# Patient Record
Sex: FEMALE | Race: WHITE | Employment: OTHER
[De-identification: names, ages, dates, MRNs, and addresses within clinical notes are randomized per-mention and may not be internally consistent; named-entity substitution may affect disease eponyms.]

---

## 2022-03-19 PROBLEM — R55 SYNCOPE AND COLLAPSE: Status: ACTIVE | Noted: 2017-03-11

## 2023-11-20 ENCOUNTER — APPOINTMENT (OUTPATIENT)
Facility: HOSPITAL | Age: 88
End: 2023-11-20
Attending: INTERNAL MEDICINE
Payer: MEDICARE

## 2023-11-20 ENCOUNTER — HOSPITAL ENCOUNTER (INPATIENT)
Facility: HOSPITAL | Age: 88
LOS: 4 days | Discharge: NURSING, REHAB OR LONG-TERM CARE | End: 2023-11-24
Attending: INTERNAL MEDICINE | Admitting: INTERNAL MEDICINE
Payer: MEDICARE

## 2023-11-20 DIAGNOSIS — I48.91 ATRIAL FIBRILLATION WITH CONTROLLED VENTRICULAR RESPONSE (HCC): Primary | ICD-10-CM

## 2023-11-20 DIAGNOSIS — I26.99 OTHER ACUTE PULMONARY EMBOLISM WITHOUT ACUTE COR PULMONALE (HCC): ICD-10-CM

## 2023-11-20 PROBLEM — A41.9 SEPSIS (HCC): Status: ACTIVE | Noted: 2023-11-20

## 2023-11-20 LAB
ALBUMIN SERPL-MCNC: 2.3 G/DL (ref 3.4–5)
ALBUMIN/GLOB SERPL: 0.9 (ref 0.8–1.7)
ALP SERPL-CCNC: 175 U/L (ref 45–117)
ALT SERPL-CCNC: 28 U/L (ref 13–56)
ANION GAP SERPL CALC-SCNC: 5 MMOL/L (ref 3–18)
APPEARANCE UR: CLEAR
APTT PPP: 108.9 SEC (ref 23–36.4)
APTT PPP: >180 SEC (ref 23–36.4)
AST SERPL-CCNC: 27 U/L (ref 10–38)
B PERT DNA SPEC QL NAA+PROBE: NOT DETECTED
BACTERIA URNS QL MICRO: ABNORMAL /HPF
BASOPHILS # BLD: 0 K/UL (ref 0–0.1)
BASOPHILS NFR BLD: 0 % (ref 0–2)
BILIRUB SERPL-MCNC: 1.5 MG/DL (ref 0.2–1)
BILIRUB UR QL: NEGATIVE
BORDETELLA PARAPERTUSSIS BY PCR: NOT DETECTED
BUN SERPL-MCNC: 31 MG/DL (ref 7–18)
BUN/CREAT SERPL: 29 (ref 12–20)
C PNEUM DNA SPEC QL NAA+PROBE: NOT DETECTED
CALCIUM SERPL-MCNC: 8 MG/DL (ref 8.5–10.1)
CHLORIDE SERPL-SCNC: 105 MMOL/L (ref 100–111)
CO2 SERPL-SCNC: 25 MMOL/L (ref 21–32)
COLOR UR: YELLOW
CREAT SERPL-MCNC: 1.06 MG/DL (ref 0.6–1.3)
DIFFERENTIAL METHOD BLD: ABNORMAL
ECHO AO ASC DIAM: 3.1 CM
ECHO AO ASCENDING AORTA INDEX: 2.03 CM/M2
ECHO AO ROOT DIAM: 2.7 CM
ECHO AO ROOT INDEX: 1.76 CM/M2
ECHO AV AREA PEAK VELOCITY: 1.7 CM2
ECHO AV AREA VTI: 1.8 CM2
ECHO AV AREA/BSA PEAK VELOCITY: 1.1 CM2/M2
ECHO AV AREA/BSA VTI: 1.2 CM2/M2
ECHO AV MEAN GRADIENT: 3 MMHG
ECHO AV MEAN VELOCITY: 0.9 M/S
ECHO AV PEAK GRADIENT: 6 MMHG
ECHO AV PEAK VELOCITY: 1.2 M/S
ECHO AV VELOCITY RATIO: 0.83
ECHO AV VTI: 28.3 CM
ECHO BSA: 1.54 M2
ECHO LA VOL A-L A2C: 78 ML (ref 22–52)
ECHO LA VOL A-L A4C: 130 ML (ref 22–52)
ECHO LA VOL BP: 105 ML (ref 22–52)
ECHO LA VOL MOD A2C: 77 ML (ref 22–52)
ECHO LA VOL MOD A4C: 115 ML (ref 22–52)
ECHO LA VOL/BSA BIPLANE: 69 ML/M2 (ref 16–34)
ECHO LA VOLUME AREA LENGTH: 115 ML
ECHO LA VOLUME INDEX A-L A2C: 51 ML/M2 (ref 16–34)
ECHO LA VOLUME INDEX A-L A4C: 85 ML/M2 (ref 16–34)
ECHO LA VOLUME INDEX AREA LENGTH: 75 ML/M2 (ref 16–34)
ECHO LA VOLUME INDEX MOD A2C: 50 ML/M2 (ref 16–34)
ECHO LA VOLUME INDEX MOD A4C: 75 ML/M2 (ref 16–34)
ECHO LV E' LATERAL VELOCITY: 5 CM/S
ECHO LV E' SEPTAL VELOCITY: 3 CM/S
ECHO LV FRACTIONAL SHORTENING: 33 % (ref 28–44)
ECHO LV INTERNAL DIMENSION DIASTOLE INDEX: 1.57 CM/M2
ECHO LV INTERNAL DIMENSION DIASTOLIC: 2.4 CM (ref 3.9–5.3)
ECHO LV INTERNAL DIMENSION SYSTOLIC INDEX: 1.05 CM/M2
ECHO LV INTERNAL DIMENSION SYSTOLIC: 1.6 CM
ECHO LV IVSD: 1.2 CM (ref 0.6–0.9)
ECHO LV MASS 2D: 81.1 G (ref 67–162)
ECHO LV MASS INDEX 2D: 53 G/M2 (ref 43–95)
ECHO LV POSTERIOR WALL DIASTOLIC: 1.2 CM (ref 0.6–0.9)
ECHO LV RELATIVE WALL THICKNESS RATIO: 1
ECHO LVOT AREA: 2.3 CM2
ECHO LVOT AV VTI INDEX: 0.81
ECHO LVOT DIAM: 1.7 CM
ECHO LVOT MEAN GRADIENT: 2 MMHG
ECHO LVOT PEAK GRADIENT: 4 MMHG
ECHO LVOT PEAK VELOCITY: 1 M/S
ECHO LVOT STROKE VOLUME INDEX: 34 ML/M2
ECHO LVOT SV: 52 ML
ECHO LVOT VTI: 22.9 CM
ECHO MV A VELOCITY: 0.38 M/S
ECHO MV AREA PHT: 2.6 CM2
ECHO MV AREA VTI: 1.3 CM2
ECHO MV E DECELERATION TIME (DT): 259.3 MS
ECHO MV E VELOCITY: 1.22 M/S
ECHO MV E/A RATIO: 3.21
ECHO MV E/E' LATERAL: 24.4
ECHO MV E/E' RATIO (AVERAGED): 32.53
ECHO MV LVOT VTI INDEX: 1.78
ECHO MV MAX VELOCITY: 1.7 M/S
ECHO MV MEAN GRADIENT: 3 MMHG
ECHO MV MEAN VELOCITY: 0.8 M/S
ECHO MV PEAK GRADIENT: 12 MMHG
ECHO MV PRESSURE HALF TIME (PHT): 83.3 MS
ECHO MV VTI: 40.7 CM
ECHO RV TAPSE: 1.5 CM (ref 1.7–?)
ECHO TV REGURGITANT MAX VELOCITY: 2.97 M/S
ECHO TV REGURGITANT PEAK GRADIENT: 35 MMHG
EKG ATRIAL RATE: 258 BPM
EKG DIAGNOSIS: NORMAL
EKG Q-T INTERVAL: 538 MS
EKG QRS DURATION: 144 MS
EKG QTC CALCULATION (BAZETT): 538 MS
EKG R AXIS: 78 DEGREES
EKG T AXIS: 45 DEGREES
EKG VENTRICULAR RATE: 60 BPM
EOSINOPHIL # BLD: 0.1 K/UL (ref 0–0.4)
EOSINOPHIL NFR BLD: 1 % (ref 0–5)
EPITH CASTS URNS QL MICRO: ABNORMAL /LPF (ref 0–5)
ERYTHROCYTE [DISTWIDTH] IN BLOOD BY AUTOMATED COUNT: 17.1 % (ref 11.6–14.5)
FLUAV SUBTYP SPEC NAA+PROBE: NOT DETECTED
FLUBV RNA SPEC QL NAA+PROBE: NOT DETECTED
FOLATE SERPL-MCNC: 7.9 NG/ML (ref 3.1–17.5)
GLOBULIN SER CALC-MCNC: 2.6 G/DL (ref 2–4)
GLUCOSE SERPL-MCNC: 104 MG/DL (ref 74–99)
GLUCOSE UR STRIP.AUTO-MCNC: NEGATIVE MG/DL
HADV DNA SPEC QL NAA+PROBE: NOT DETECTED
HCOV 229E RNA SPEC QL NAA+PROBE: NOT DETECTED
HCOV HKU1 RNA SPEC QL NAA+PROBE: NOT DETECTED
HCOV NL63 RNA SPEC QL NAA+PROBE: NOT DETECTED
HCOV OC43 RNA SPEC QL NAA+PROBE: NOT DETECTED
HCT VFR BLD AUTO: 26.4 % (ref 35–45)
HGB BLD-MCNC: 9.2 G/DL (ref 12–16)
HGB UR QL STRIP: NEGATIVE
HMPV RNA SPEC QL NAA+PROBE: NOT DETECTED
HPIV1 RNA SPEC QL NAA+PROBE: NOT DETECTED
HPIV2 RNA SPEC QL NAA+PROBE: NOT DETECTED
HPIV3 RNA SPEC QL NAA+PROBE: NOT DETECTED
HPIV4 RNA SPEC QL NAA+PROBE: NOT DETECTED
IMM GRANULOCYTES # BLD AUTO: 0.1 K/UL (ref 0–0.04)
IMM GRANULOCYTES NFR BLD AUTO: 1 % (ref 0–0.5)
KETONES UR QL STRIP.AUTO: NEGATIVE MG/DL
L PNEUMO AG UR QL IA: NEGATIVE
LEUKOCYTE ESTERASE UR QL STRIP.AUTO: ABNORMAL
LYMPHOCYTES # BLD: 0.8 K/UL (ref 0.9–3.6)
LYMPHOCYTES NFR BLD: 7 % (ref 21–52)
M PNEUMO DNA SPEC QL NAA+PROBE: NOT DETECTED
MCH RBC QN AUTO: 36.8 PG (ref 24–34)
MCHC RBC AUTO-ENTMCNC: 34.8 G/DL (ref 31–37)
MCV RBC AUTO: 105.6 FL (ref 78–100)
MONOCYTES # BLD: 0.6 K/UL (ref 0.05–1.2)
MONOCYTES NFR BLD: 5 % (ref 3–10)
NEUTS SEG # BLD: 9.7 K/UL (ref 1.8–8)
NEUTS SEG NFR BLD: 86 % (ref 40–73)
NITRITE UR QL STRIP.AUTO: NEGATIVE
NRBC # BLD: 0.05 K/UL (ref 0–0.01)
NRBC BLD-RTO: 0.4 PER 100 WBC
NT PRO BNP: 6997 PG/ML (ref 0–1800)
PH UR STRIP: 5.5 (ref 5–8)
PLATELET # BLD AUTO: 182 K/UL (ref 135–420)
PMV BLD AUTO: 10.4 FL (ref 9.2–11.8)
POTASSIUM SERPL-SCNC: 4.2 MMOL/L (ref 3.5–5.5)
PROCALCITONIN SERPL-MCNC: 1.44 NG/ML
PROT SERPL-MCNC: 4.9 G/DL (ref 6.4–8.2)
PROT UR STRIP-MCNC: NEGATIVE MG/DL
RBC # BLD AUTO: 2.5 M/UL (ref 4.2–5.3)
RBC #/AREA URNS HPF: NEGATIVE /HPF (ref 0–5)
RSV RNA SPEC QL NAA+PROBE: NOT DETECTED
RV+EV RNA SPEC QL NAA+PROBE: NOT DETECTED
S PNEUM AG UR QL: NEGATIVE
SARS-COV-2 RNA RESP QL NAA+PROBE: NOT DETECTED
SODIUM SERPL-SCNC: 135 MMOL/L (ref 136–145)
SP GR UR REFRACTOMETRY: 1.01 (ref 1–1.03)
UROBILINOGEN UR QL STRIP.AUTO: 0.2 EU/DL (ref 0.2–1)
VIT B12 SERPL-MCNC: >2000 PG/ML (ref 211–911)
WBC # BLD AUTO: 11.4 K/UL (ref 4.6–13.2)
WBC URNS QL MICRO: ABNORMAL /HPF (ref 0–4)

## 2023-11-20 PROCEDURE — 6360000002 HC RX W HCPCS: Performed by: INTERNAL MEDICINE

## 2023-11-20 PROCEDURE — 97535 SELF CARE MNGMENT TRAINING: CPT

## 2023-11-20 PROCEDURE — 71045 X-RAY EXAM CHEST 1 VIEW: CPT

## 2023-11-20 PROCEDURE — APPSS15 APP SPLIT SHARED TIME 0-15 MINUTES

## 2023-11-20 PROCEDURE — 6370000000 HC RX 637 (ALT 250 FOR IP): Performed by: HOSPITALIST

## 2023-11-20 PROCEDURE — 6370000000 HC RX 637 (ALT 250 FOR IP): Performed by: INTERNAL MEDICINE

## 2023-11-20 PROCEDURE — 2580000003 HC RX 258: Performed by: INTERNAL MEDICINE

## 2023-11-20 PROCEDURE — 93010 ELECTROCARDIOGRAM REPORT: CPT | Performed by: INTERNAL MEDICINE

## 2023-11-20 PROCEDURE — 80053 COMPREHEN METABOLIC PANEL: CPT

## 2023-11-20 PROCEDURE — 83880 ASSAY OF NATRIURETIC PEPTIDE: CPT

## 2023-11-20 PROCEDURE — 84145 PROCALCITONIN (PCT): CPT

## 2023-11-20 PROCEDURE — 81001 URINALYSIS AUTO W/SCOPE: CPT

## 2023-11-20 PROCEDURE — 97166 OT EVAL MOD COMPLEX 45 MIN: CPT

## 2023-11-20 PROCEDURE — 82607 VITAMIN B-12: CPT

## 2023-11-20 PROCEDURE — 87040 BLOOD CULTURE FOR BACTERIA: CPT

## 2023-11-20 PROCEDURE — 87086 URINE CULTURE/COLONY COUNT: CPT

## 2023-11-20 PROCEDURE — 87449 NOS EACH ORGANISM AG IA: CPT

## 2023-11-20 PROCEDURE — 99222 1ST HOSP IP/OBS MODERATE 55: CPT | Performed by: INTERNAL MEDICINE

## 2023-11-20 PROCEDURE — 97530 THERAPEUTIC ACTIVITIES: CPT

## 2023-11-20 PROCEDURE — 82746 ASSAY OF FOLIC ACID SERUM: CPT

## 2023-11-20 PROCEDURE — 2700000000 HC OXYGEN THERAPY PER DAY

## 2023-11-20 PROCEDURE — 93306 TTE W/DOPPLER COMPLETE: CPT

## 2023-11-20 PROCEDURE — 93306 TTE W/DOPPLER COMPLETE: CPT | Performed by: INTERNAL MEDICINE

## 2023-11-20 PROCEDURE — 0202U NFCT DS 22 TRGT SARS-COV-2: CPT

## 2023-11-20 PROCEDURE — 94761 N-INVAS EAR/PLS OXIMETRY MLT: CPT

## 2023-11-20 PROCEDURE — 36415 COLL VENOUS BLD VENIPUNCTURE: CPT

## 2023-11-20 PROCEDURE — 85730 THROMBOPLASTIN TIME PARTIAL: CPT

## 2023-11-20 PROCEDURE — 85025 COMPLETE CBC W/AUTO DIFF WBC: CPT

## 2023-11-20 PROCEDURE — 1100000000 HC RM PRIVATE

## 2023-11-20 PROCEDURE — 93005 ELECTROCARDIOGRAM TRACING: CPT | Performed by: INTERNAL MEDICINE

## 2023-11-20 PROCEDURE — 97162 PT EVAL MOD COMPLEX 30 MIN: CPT

## 2023-11-20 RX ORDER — HEPARIN SODIUM 1000 [USP'U]/ML
40 INJECTION, SOLUTION INTRAVENOUS; SUBCUTANEOUS PRN
Status: DISCONTINUED | OUTPATIENT
Start: 2023-11-20 | End: 2023-11-22

## 2023-11-20 RX ORDER — CYCLOSPORINE 0.5 MG/ML
1 EMULSION OPHTHALMIC
Status: ON HOLD | COMMUNITY

## 2023-11-20 RX ORDER — POLYETHYLENE GLYCOL 3350 17 G/17G
17 POWDER, FOR SOLUTION ORAL DAILY PRN
Status: DISCONTINUED | OUTPATIENT
Start: 2023-11-20 | End: 2023-11-21

## 2023-11-20 RX ORDER — SPIRONOLACTONE 25 MG/1
25 TABLET ORAL DAILY
Status: ON HOLD | COMMUNITY

## 2023-11-20 RX ORDER — IPRATROPIUM BROMIDE AND ALBUTEROL SULFATE 2.5; .5 MG/3ML; MG/3ML
1 SOLUTION RESPIRATORY (INHALATION) EVERY 4 HOURS PRN
Status: DISCONTINUED | OUTPATIENT
Start: 2023-11-20 | End: 2023-11-24 | Stop reason: HOSPADM

## 2023-11-20 RX ORDER — ATENOLOL 50 MG/1
50 TABLET ORAL DAILY
Status: DISCONTINUED | OUTPATIENT
Start: 2023-11-20 | End: 2023-11-24 | Stop reason: HOSPADM

## 2023-11-20 RX ORDER — ONDANSETRON 4 MG/1
4 TABLET, ORALLY DISINTEGRATING ORAL EVERY 8 HOURS PRN
Status: DISCONTINUED | OUTPATIENT
Start: 2023-11-20 | End: 2023-11-24 | Stop reason: HOSPADM

## 2023-11-20 RX ORDER — SPIRONOLACTONE AND HYDROCHLOROTHIAZIDE 25; 25 MG/1; MG/1
1 TABLET ORAL DAILY
Status: ON HOLD | COMMUNITY
Start: 2023-09-05 | End: 2023-11-20

## 2023-11-20 RX ORDER — MAGNESIUM SULFATE IN WATER 40 MG/ML
2000 INJECTION, SOLUTION INTRAVENOUS PRN
Status: DISCONTINUED | OUTPATIENT
Start: 2023-11-20 | End: 2023-11-24 | Stop reason: HOSPADM

## 2023-11-20 RX ORDER — SODIUM CHLORIDE 9 MG/ML
INJECTION, SOLUTION INTRAVENOUS PRN
Status: DISCONTINUED | OUTPATIENT
Start: 2023-11-20 | End: 2023-11-24 | Stop reason: HOSPADM

## 2023-11-20 RX ORDER — ATENOLOL 50 MG/1
50 TABLET ORAL DAILY
Status: ON HOLD | COMMUNITY
Start: 2023-09-05

## 2023-11-20 RX ORDER — HEPARIN SODIUM 1000 [USP'U]/ML
80 INJECTION, SOLUTION INTRAVENOUS; SUBCUTANEOUS ONCE
Status: COMPLETED | OUTPATIENT
Start: 2023-11-20 | End: 2023-11-20

## 2023-11-20 RX ORDER — ASPIRIN 81 MG/1
81 TABLET ORAL DAILY
Status: DISCONTINUED | OUTPATIENT
Start: 2023-11-20 | End: 2023-11-24 | Stop reason: HOSPADM

## 2023-11-20 RX ORDER — ACETAMINOPHEN 325 MG/1
650 TABLET ORAL EVERY 6 HOURS PRN
Status: DISCONTINUED | OUTPATIENT
Start: 2023-11-20 | End: 2023-11-24 | Stop reason: HOSPADM

## 2023-11-20 RX ORDER — SPIRONOLACTONE 25 MG/1
25 TABLET ORAL DAILY
Status: DISCONTINUED | OUTPATIENT
Start: 2023-11-20 | End: 2023-11-24 | Stop reason: HOSPADM

## 2023-11-20 RX ORDER — SODIUM CHLORIDE 0.9 % (FLUSH) 0.9 %
5-40 SYRINGE (ML) INJECTION PRN
Status: DISCONTINUED | OUTPATIENT
Start: 2023-11-20 | End: 2023-11-24 | Stop reason: HOSPADM

## 2023-11-20 RX ORDER — ONDANSETRON 2 MG/ML
4 INJECTION INTRAMUSCULAR; INTRAVENOUS EVERY 6 HOURS PRN
Status: DISCONTINUED | OUTPATIENT
Start: 2023-11-20 | End: 2023-11-24 | Stop reason: HOSPADM

## 2023-11-20 RX ORDER — FUROSEMIDE 10 MG/ML
40 INJECTION INTRAMUSCULAR; INTRAVENOUS ONCE
Status: COMPLETED | OUTPATIENT
Start: 2023-11-20 | End: 2023-11-20

## 2023-11-20 RX ORDER — VANCOMYCIN 1.75 G/350ML
1250 INJECTION, SOLUTION INTRAVENOUS ONCE
Status: COMPLETED | OUTPATIENT
Start: 2023-11-20 | End: 2023-11-20

## 2023-11-20 RX ORDER — HEPARIN SODIUM 10000 [USP'U]/100ML
5-30 INJECTION, SOLUTION INTRAVENOUS CONTINUOUS
Status: DISPENSED | OUTPATIENT
Start: 2023-11-20 | End: 2023-11-22

## 2023-11-20 RX ORDER — ASPIRIN 81 MG/1
81 TABLET ORAL DAILY
Qty: 14 TABLET | Refills: 0 | Status: ON HOLD | COMMUNITY
Start: 2023-11-10 | End: 2023-11-24

## 2023-11-20 RX ORDER — HEPARIN SODIUM 1000 [USP'U]/ML
80 INJECTION, SOLUTION INTRAVENOUS; SUBCUTANEOUS PRN
Status: DISCONTINUED | OUTPATIENT
Start: 2023-11-20 | End: 2023-11-22

## 2023-11-20 RX ORDER — ACETAMINOPHEN 650 MG/1
650 SUPPOSITORY RECTAL EVERY 6 HOURS PRN
Status: DISCONTINUED | OUTPATIENT
Start: 2023-11-20 | End: 2023-11-24 | Stop reason: HOSPADM

## 2023-11-20 RX ORDER — MULTIVITAMIN WITH IRON
1 TABLET ORAL DAILY
Status: DISCONTINUED | OUTPATIENT
Start: 2023-11-20 | End: 2023-11-24 | Stop reason: HOSPADM

## 2023-11-20 RX ORDER — POTASSIUM CHLORIDE 7.45 MG/ML
10 INJECTION INTRAVENOUS PRN
Status: DISCONTINUED | OUTPATIENT
Start: 2023-11-20 | End: 2023-11-24 | Stop reason: HOSPADM

## 2023-11-20 RX ORDER — SODIUM CHLORIDE 0.9 % (FLUSH) 0.9 %
5-40 SYRINGE (ML) INJECTION EVERY 12 HOURS SCHEDULED
Status: DISCONTINUED | OUTPATIENT
Start: 2023-11-20 | End: 2023-11-24 | Stop reason: HOSPADM

## 2023-11-20 RX ADMIN — ATENOLOL 50 MG: 25 TABLET ORAL at 09:14

## 2023-11-20 RX ADMIN — SODIUM CHLORIDE, PRESERVATIVE FREE 10 ML: 5 INJECTION INTRAVENOUS at 21:08

## 2023-11-20 RX ADMIN — CEFEPIME 1000 MG: 1 INJECTION, POWDER, FOR SOLUTION INTRAMUSCULAR; INTRAVENOUS at 21:07

## 2023-11-20 RX ADMIN — ASPIRIN 81 MG: 81 TABLET, COATED ORAL at 09:14

## 2023-11-20 RX ADMIN — FUROSEMIDE 40 MG: 10 INJECTION, SOLUTION INTRAMUSCULAR; INTRAVENOUS at 09:14

## 2023-11-20 RX ADMIN — SPIRONOLACTONE 25 MG: 25 TABLET ORAL at 09:14

## 2023-11-20 RX ADMIN — VANCOMYCIN 1250 MG: 1.75 INJECTION, SOLUTION INTRAVENOUS at 09:15

## 2023-11-20 RX ADMIN — HEPARIN SODIUM 4430 UNITS: 1000 INJECTION, SOLUTION INTRAVENOUS; SUBCUTANEOUS at 06:52

## 2023-11-20 RX ADMIN — HEPARIN SODIUM 18 UNITS/KG/HR: 10000 INJECTION, SOLUTION INTRAVENOUS at 06:50

## 2023-11-20 RX ADMIN — SODIUM CHLORIDE, PRESERVATIVE FREE 10 ML: 5 INJECTION INTRAVENOUS at 09:28

## 2023-11-20 RX ADMIN — CEFEPIME 2000 MG: 2 INJECTION, POWDER, FOR SOLUTION INTRAVENOUS at 09:14

## 2023-11-20 RX ADMIN — THERA TABS 1 TABLET: TAB at 16:19

## 2023-11-20 ASSESSMENT — PAIN SCALES - GENERAL
PAINLEVEL_OUTOF10: 0

## 2023-11-20 ASSESSMENT — ENCOUNTER SYMPTOMS
SORE THROAT: 0
ABDOMINAL PAIN: 0
NAUSEA: 0
VOMITING: 0
WHEEZING: 0
SHORTNESS OF BREATH: 0

## 2023-11-20 NOTE — PLAN OF CARE
Problem: Discharge Planning  Goal: Discharge to home or other facility with appropriate resources  Outcome: Progressing  Flowsheets (Taken 11/20/2023 0800)  Discharge to home or other facility with appropriate resources:   Identify barriers to discharge with patient and caregiver   Arrange for needed discharge resources and transportation as appropriate   Identify discharge learning needs (meds, wound care, etc)     Problem: Safety - Adult  Goal: Free from fall injury  Outcome: Progressing     Problem: Skin/Tissue Integrity  Goal: Absence of new skin breakdown  Description: 1. Monitor for areas of redness and/or skin breakdown  2. Assess vascular access sites hourly  3. Every 4-6 hours minimum:  Change oxygen saturation probe site  4. Every 4-6 hours:  If on nasal continuous positive airway pressure, respiratory therapy assess nares and determine need for appliance change or resting period. Outcome: Progressing     Problem: Physical Therapy - Adult  Goal: By Discharge: Performs mobility at highest level of function for planned discharge setting. See evaluation for individualized goals. Description: Physical Therapy Goals:  Initiated 11/20/2023 to be met within 7-10 days. 1.  Patient will move from supine to sit and sit to supine  in bed with contact guard assistance. 2.  Patient will transfer from bed to chair and chair to bed with minimal assistance using the least restrictive device. 3.  Patient will perform sit to stand with minimal assistance. 4.  Patient will ambulate with modified minimal assistance for 50 feet with the least restrictive device. 5.  Patient will perform LE exercises to increase strength/ROM for functional tasks    PLOF: Patient is coming from rehab facility following right hip surgery. She was independent with ADL's and mobility. Lives alone.       11/20/2023 1352 by Eliana Johns PT  Outcome: Progressing     Problem: Occupational Therapy - Adult  Goal: By Discharge:

## 2023-11-20 NOTE — PROGRESS NOTES
conducted an initial consultation and Spiritual Assessment for Lily Borrego, who is a 80 y. o.,female. Patient's Primary Language is: [place]. According to the patient's EMR Rastafarian Affiliation is: None. The reason the Patient came to the hospital is:   Patient Active Problem List    Diagnosis Date Noted    Sepsis ([address]) 11/20/2023    Recurrent UTI     Atrial fibrillation with controlled ventricular response ([address])     Urinary retention     Syncope and collapse 03/11/2017    Essential hypertension, benign 11/04/2014    History of frequent urinary tract infections 08/23/2013    Incomplete bladder emptying 10/19/2012    Microhematuria 10/19/2012    UTI (urinary tract infection), uncomplicated 78/26/8780        The  provided the following Interventions:  Initiated a relationship of care and support. Explored issues of michelle, belief, spirituality and Baptism/ritual needs while hospitalized. Listened empathically. Provided chaplaincy education concerning Advance Medical Directive. Provided information about Spiritual Care Services. Offered prayer and assurance of continued prayers on patient's behalf. Chart reviewed. The following outcomes where achieved:  Patient shared limited information about both their medical narrative and spiritual journey/beliefs.  confirmed Patient's Rastafarian Affiliation. Patient processed feeling about current hospitalization. Patient expressed gratitude for 's visit. Assessment:  Patient does not have any Baptism/cultural needs that will affect patient's preferences in health care. There are no spiritual or Baptism issues which require intervention at this time. Plan:  Chaplains will continue to follow and will provide pastoral care on an as needed/requested basis.  recommends bedside caregivers page  on duty if patient shows signs of acute spiritual or emotional distress. Per patient, doing well.  Not feeling miserable or any pain. Peaceful and hopeful to be discharged soon. Patient hearing impaired. Prayer of comfort.       [facility]   Staff [address] [facility]  [phone]

## 2023-11-20 NOTE — CONSULTS
Comprehensive Nutrition Assessment    Type and Reason for Visit:  Initial, Consult    Nutrition Recommendations/Plan:   Modify current diet- remove carbohydrate restriction r/t pt without h/o DM. Continue 2000 mL fluid restriction per MD.   Plan to add oral nutrition supplement to optimize nutrition intake opportunity: Ensure Plus High Protein (each provides 350 kcal, 20g protein) BID  Plan to add MVI r/t pt reports poor meal intake PTA. Monitor PO intake/tolerance of meals/supplements, weight, labs, and POC while admitted. Malnutrition Assessment:  Malnutrition Status:  Insufficient data (at risk for malnutrition r/t reports of poor meal intake PTA, unable to verify) (11/20/23 1204)    Context:  Acute Illness       Nutrition History and Allergies: PMHx: HTN, urinary retention with chronic sanchez with recurrent UTI's, afib not on anticoagulation. Wt hx: c wt- 122.125 lb (bed scale), 97 lb (9/20/23, +25.9%), 100 lb (5/11/23, +22.1%), 94 lb (8/9/22, -29.9%), no significant wt loss documented, though note pt underweight for most weights listed in history and question accuracy of current wt vs weights listed in history. Peanut allergy noted. Nutrition Assessment:    Pt presented with fever and hypoxia. Admitted for management of sepsis. Consult noted for poor intake/appetite x 5 or more days. Pt on droplet plus isolation, unable to reach pt on phone. Noted pt with carbohydrate restriction, no h/o diabetes. Plan to remove carbohydrate restriction from diet and add oral nutrition supplement to promote PO intake. Nutrition Related Findings:    Last BM: not documented. Edema: BLE. Labs: no BMP completed. Pertinent meds: reviewed. Wound Type: None      Current Nutrition Intake & Therapies:    Average Meal Intake: Unable to assess (No PO data)  Average Supplements Intake: None Ordered  ADULT DIET;  Regular; 3 carb choices (45 gm/meal); 2000 ml    Anthropometric Measures:  Height: 154.9 cm (5' 1\")  Ideal Body

## 2023-11-20 NOTE — PROGRESS NOTES
Review of Systems    Physical Exam  Skin:           3507- Patient JAKE X 4. Direct admit from [facility]. Patient cleaned, oriented to room, call light within reach, bed in the lowest position, alarm activated. Admitting MD paged to assess patient and enter orders. 1412- Dr. Kimberly Carney bedside. 0630- Orders placed to start heparin drip.     0650- Heparin drip initiated and verified with secondary RN.     0730- Bedside shift change report given to Lily Johnson RN (oncoming nurse) by Marli Deshpande RN (offgoing nurse). Report included the following information Nurse Handoff Report, Adult Overview, and Event Log.

## 2023-11-20 NOTE — PROGRESS NOTES
0700: Bedside and Verbal shift change report given to Sloane Rubin RN (oncoming nurse) by Bran Zhao RN (offgoing nurse). Report included the following information Nurse Handoff Report, Intake/Output, MAR, and Quality Measures. Heparin gtt running at 18u/kg/h.    0800: Morning assessment completed. Pt denies pain at this time. Call bell within reach, bed in lowest locked position. Bed alarm on.    1100: 1100 yellow/fabiola in sanchez bag prior to removal. Sanchez pt had PTA removed per order. Ordered respiratory panel sent down per order. 1340: Pt straight cath'd per order. 400ml output achieved. Pt tolerated well. Specimen awaiting pickup.    1452: Heparin gtt stopped for an hour per protocol. Pt APTT >180.0 Attending paged. 1611: Heparin gtt restarted, rate adjusted to 15u/kg/hr. Next APTT due 2200 hours. 1700: Pt received a full linen change, repositioning at this time. Call bell within reach, bed in lowest, locked position. Pt has bilateral hearing aids in a case charging on chair in room. Sanchez catheter inserted per order. Pt tolerated well. 1250 output achieved. 1800: Report called to [address] Rose Montelongo RN on 4N. Awaiting transport at this time.

## 2023-11-20 NOTE — PLAN OF CARE
Problem: Physical Therapy - Adult  Goal: By Discharge: Performs mobility at highest level of function for planned discharge setting. See evaluation for individualized goals. Description: Physical Therapy Goals:  Initiated 11/20/2023 to be met within 7-10 days. 1.  Patient will move from supine to sit and sit to supine  in bed with contact guard assistance. 2.  Patient will transfer from bed to chair and chair to bed with minimal assistance using the least restrictive device. 3.  Patient will perform sit to stand with minimal assistance. 4.  Patient will ambulate with modified minimal assistance for 50 feet with the least restrictive device. 5.  Patient will perform LE exercises to increase strength/ROM for functional tasks    PLOF: Patient is coming from rehab facility following right hip surgery. She was independent with ADL's and mobility. Lives alone. Outcome: Progressing     PHYSICAL THERAPY EVALUATION    Patient: Rosa Paul (80 y.o. female)  Date: 11/20/2023  Primary Diagnosis: Sepsis ([address]) [A41.9]       Precautions: Fall Risk, Weight Bearing, Right Lower Extremity Weight Bearing: Weight Bearing As Tolerated (s/p R hip sx following femoral fx),  ,  ,  ,  ,  ,      ASSESSMENT :  Cleared by nursing for bed level evaluation. Seen with OT to maximize safety with mobility. Patient received resting in bed, alert, and agreeable to PT evaluation. Pleasant and cooperative. [place]. Min/mod A supine<>sit transfer. She sat EOB for about 8 minutes performing right LE exercises per flow sheet with SBA. Able to scoot laterally along EOB with CGA and returns to supine with assistance to manage right leg. Patient left resting comfortably with pillows to support swollen UE's and to float heels. All needs left within reach and bed alarm activated for safety.      DEFICITS/IMPAIRMENTS:    , Body Structures, Functions, Activity Limitations Requiring Skilled Therapeutic Intervention: Decreased functional mobility ;Decreased strength;Decreased safe awareness;Decreased balance;Decreased endurance; Increased pain    Patient will benefit from skilled intervention to address the above impairments. Patient's rehabilitation potential/Therapy Prognosis: Good. Factors which may influence rehabilitation potential include:   []         None noted  []         Mental ability/status  []         Medical condition  [x]         Home/family situation and support systems  [x]         Safety awareness  [x]         Pain tolerance/management  []         Other:      PLAN :  Recommendations and Planned Interventions:   [x]           Bed Mobility Training             [x]    Neuromuscular Re-Education  [x]           Transfer Training                   []    Orthotic/Prosthetic Training  [x]           Gait Training                          []    Modalities  [x]           Therapeutic Exercises           []    Edema Management/Control  [x]           Therapeutic Activities            [x]    Family Training/Education  [x]           Patient Education  []           Other (comment):    Frequency/Duration: Patient will be followed by physical therapy to address goals, 1-2 times per day/3-5 days per week to address goals. Further Equipment Recommendations for Discharge: JEAN CLAUDE    [facility]: AM-PAC Inpatient Mobility Raw Score : 13      Current research shows that an AM-PAC score of 17 (13 without stairs) or less is not associated with a discharge to the patient's home setting. Based on an AM-PAC score and their current functional mobility deficits, it is recommended that the patient have 5-7 sessions per week of Physical Therapy at d/c to increase the patient's independence. Currently, this patient demonstrates the potential endurance, and/or tolerance for 3 hours of therapy each day at d/c. This [facility] score should be considered in conjunction with interdisciplinary team recommendations to determine the most appropriate discharge setting.  Patient's social

## 2023-11-21 ENCOUNTER — APPOINTMENT (OUTPATIENT)
Facility: HOSPITAL | Age: 88
End: 2023-11-21
Attending: INTERNAL MEDICINE
Payer: MEDICARE

## 2023-11-21 PROBLEM — E43 SEVERE PROTEIN-CALORIE MALNUTRITION (HCC): Status: ACTIVE | Noted: 2023-11-21

## 2023-11-21 LAB
ANION GAP SERPL CALC-SCNC: 5 MMOL/L (ref 3–18)
APTT PPP: 103.2 SEC (ref 23–36.4)
BACTERIA SPEC CULT: NORMAL
BASOPHILS # BLD: 0 K/UL (ref 0–0.1)
BASOPHILS NFR BLD: 0 % (ref 0–2)
BUN SERPL-MCNC: 33 MG/DL (ref 7–18)
BUN/CREAT SERPL: 27 (ref 12–20)
CALCIUM SERPL-MCNC: 8 MG/DL (ref 8.5–10.1)
CHLORIDE SERPL-SCNC: 103 MMOL/L (ref 100–111)
CO2 SERPL-SCNC: 28 MMOL/L (ref 21–32)
CREAT SERPL-MCNC: 1.21 MG/DL (ref 0.6–1.3)
DIFFERENTIAL METHOD BLD: ABNORMAL
EOSINOPHIL # BLD: 0.3 K/UL (ref 0–0.4)
EOSINOPHIL NFR BLD: 4 % (ref 0–5)
ERYTHROCYTE [DISTWIDTH] IN BLOOD BY AUTOMATED COUNT: 17.4 % (ref 11.6–14.5)
GLUCOSE SERPL-MCNC: 108 MG/DL (ref 74–99)
HCT VFR BLD AUTO: 25.6 % (ref 35–45)
HGB BLD-MCNC: 8.6 G/DL (ref 12–16)
IMM GRANULOCYTES # BLD AUTO: 0.1 K/UL (ref 0–0.04)
IMM GRANULOCYTES NFR BLD AUTO: 1 % (ref 0–0.5)
LYMPHOCYTES # BLD: 1 K/UL (ref 0.9–3.6)
LYMPHOCYTES NFR BLD: 14 % (ref 21–52)
MAGNESIUM SERPL-MCNC: 1.9 MG/DL (ref 1.6–2.6)
MCH RBC QN AUTO: 34.8 PG (ref 24–34)
MCHC RBC AUTO-ENTMCNC: 33.6 G/DL (ref 31–37)
MCV RBC AUTO: 103.6 FL (ref 78–100)
MONOCYTES # BLD: 0.6 K/UL (ref 0.05–1.2)
MONOCYTES NFR BLD: 8 % (ref 3–10)
NEUTS SEG # BLD: 5.6 K/UL (ref 1.8–8)
NEUTS SEG NFR BLD: 73 % (ref 40–73)
NRBC # BLD: 0.03 K/UL (ref 0–0.01)
NRBC BLD-RTO: 0.4 PER 100 WBC
PHOSPHATE SERPL-MCNC: 3.2 MG/DL (ref 2.5–4.9)
PLATELET # BLD AUTO: 180 K/UL (ref 135–420)
PMV BLD AUTO: 10.3 FL (ref 9.2–11.8)
POTASSIUM SERPL-SCNC: 3.5 MMOL/L (ref 3.5–5.5)
RBC # BLD AUTO: 2.47 M/UL (ref 4.2–5.3)
SERVICE CMNT-IMP: NORMAL
SODIUM SERPL-SCNC: 136 MMOL/L (ref 136–145)
VANCOMYCIN SERPL-MCNC: 18.8 UG/ML (ref 5–40)
WBC # BLD AUTO: 7.6 K/UL (ref 4.6–13.2)

## 2023-11-21 PROCEDURE — 1100000000 HC RM PRIVATE

## 2023-11-21 PROCEDURE — 99232 SBSQ HOSP IP/OBS MODERATE 35: CPT | Performed by: HOSPITALIST

## 2023-11-21 PROCEDURE — 94761 N-INVAS EAR/PLS OXIMETRY MLT: CPT

## 2023-11-21 PROCEDURE — 80202 ASSAY OF VANCOMYCIN: CPT

## 2023-11-21 PROCEDURE — 36415 COLL VENOUS BLD VENIPUNCTURE: CPT

## 2023-11-21 PROCEDURE — 80048 BASIC METABOLIC PNL TOTAL CA: CPT

## 2023-11-21 PROCEDURE — 6370000000 HC RX 637 (ALT 250 FOR IP): Performed by: INTERNAL MEDICINE

## 2023-11-21 PROCEDURE — 99232 SBSQ HOSP IP/OBS MODERATE 35: CPT | Performed by: INTERNAL MEDICINE

## 2023-11-21 PROCEDURE — 83735 ASSAY OF MAGNESIUM: CPT

## 2023-11-21 PROCEDURE — 6360000002 HC RX W HCPCS: Performed by: INTERNAL MEDICINE

## 2023-11-21 PROCEDURE — 2580000003 HC RX 258: Performed by: INTERNAL MEDICINE

## 2023-11-21 PROCEDURE — 93970 EXTREMITY STUDY: CPT

## 2023-11-21 PROCEDURE — 85025 COMPLETE CBC W/AUTO DIFF WBC: CPT

## 2023-11-21 PROCEDURE — 84100 ASSAY OF PHOSPHORUS: CPT

## 2023-11-21 PROCEDURE — 6370000000 HC RX 637 (ALT 250 FOR IP): Performed by: HOSPITALIST

## 2023-11-21 PROCEDURE — 85730 THROMBOPLASTIN TIME PARTIAL: CPT

## 2023-11-21 RX ORDER — POLYETHYLENE GLYCOL 3350 17 G/17G
17 POWDER, FOR SOLUTION ORAL DAILY
Status: DISCONTINUED | OUTPATIENT
Start: 2023-11-21 | End: 2023-11-24 | Stop reason: HOSPADM

## 2023-11-21 RX ORDER — SENNA AND DOCUSATE SODIUM 50; 8.6 MG/1; MG/1
2 TABLET, FILM COATED ORAL NIGHTLY
Status: DISCONTINUED | OUTPATIENT
Start: 2023-11-21 | End: 2023-11-24 | Stop reason: HOSPADM

## 2023-11-21 RX ORDER — GAUZE BANDAGE 2" X 2"
100 BANDAGE TOPICAL DAILY
Status: DISCONTINUED | OUTPATIENT
Start: 2023-11-21 | End: 2023-11-24 | Stop reason: HOSPADM

## 2023-11-21 RX ADMIN — DOCUSATE SODIUM 50 MG AND SENNOSIDES 8.6 MG 2 TABLET: 8.6; 5 TABLET, FILM COATED ORAL at 21:47

## 2023-11-21 RX ADMIN — SPIRONOLACTONE 25 MG: 25 TABLET ORAL at 09:22

## 2023-11-21 RX ADMIN — Medication 100 MG: at 15:51

## 2023-11-21 RX ADMIN — HEPARIN SODIUM 15 UNITS/KG/HR: 10000 INJECTION, SOLUTION INTRAVENOUS at 19:47

## 2023-11-21 RX ADMIN — CEFEPIME 1000 MG: 1 INJECTION, POWDER, FOR SOLUTION INTRAMUSCULAR; INTRAVENOUS at 21:47

## 2023-11-21 RX ADMIN — POLYETHYLENE GLYCOL 3350 17 G: 17 POWDER, FOR SOLUTION ORAL at 15:51

## 2023-11-21 RX ADMIN — SODIUM CHLORIDE, PRESERVATIVE FREE 10 ML: 5 INJECTION INTRAVENOUS at 21:30

## 2023-11-21 RX ADMIN — ASPIRIN 81 MG: 81 TABLET, COATED ORAL at 09:22

## 2023-11-21 RX ADMIN — CEFEPIME 1000 MG: 1 INJECTION, POWDER, FOR SOLUTION INTRAMUSCULAR; INTRAVENOUS at 10:46

## 2023-11-21 RX ADMIN — THERA TABS 1 TABLET: TAB at 09:22

## 2023-11-21 RX ADMIN — ATENOLOL 50 MG: 25 TABLET ORAL at 09:22

## 2023-11-21 RX ADMIN — SODIUM CHLORIDE, PRESERVATIVE FREE 10 ML: 5 INJECTION INTRAVENOUS at 09:22

## 2023-11-21 RX ADMIN — VANCOMYCIN HYDROCHLORIDE 750 MG: 750 INJECTION, POWDER, LYOPHILIZED, FOR SOLUTION INTRAVENOUS at 09:24

## 2023-11-21 ASSESSMENT — PAIN SCALES - GENERAL
PAINLEVEL_OUTOF10: 0

## 2023-11-21 NOTE — PROGRESS NOTES
Bedside and Verbal shift change report given to [facility] Dave (oncoming nurse) by Ad Gonzales RN (offgoing nurse). Report included the following information Nurse Handoff Report, Index, Adult Overview, Surgery Report, Intake/Output, MAR, and Recent Results.

## 2023-11-21 NOTE — PROGRESS NOTES
Comprehensive Nutrition Assessment    Type and Reason for Visit:  Reassess, Consult    Nutrition Recommendations/Plan:   Update food preferences in diet order  Continue daily MVI supplement r/t pt with malnutrition. Add daily thiamine supplement  Continue nutrition supplement: Ensure Plus/ Enlive (350 kcal, 20 gm protein each); specify in order that pt prefers ensure enlive. Okay for family to provide ensure enlive if not available in-house. Monitor PO intake/tolerance of meals/supplements, weight, labs, and POC while admitted. Malnutrition Assessment:  Malnutrition Status:  Severe malnutrition (11/21/23 1311)    Context: Acute Illness  Findings of clinical characteristics of malnutrition:   Energy Intake:  Mild decrease in energy intake (Comment) (usually eats small meals)  Weight Loss:  Unable to assess     Body Fat Loss: Moderate body fat loss Orbital, Buccal region   Muscle Mass Loss: Moderate muscle mass loss Hand (interosseous), Temples (temporalis)  Fluid Accumulation:  Moderate to Severe (unable to assess if related to nutrition status)     Strength:  Not Performed      Nutrition Assessment:    Pt presented with fever and hypoxia. Admitted for management of sepsis. Pt reported that she eats 3 meals daily at home; small meals. UBW is 91 lb per daughter report; pt recently weighing 112 lb, daughter reported was due to fluid retention; noted pt has edema. Pt currently eating ~15- 25% of meals. Food preferences discussed. Pt receiving ensure original supplement, but per daughter, pt usually drinks ensure enlive; this item is low in stock in-house, but usually is on supply in-house. Discussed with daughter, okay to provide ensure enlive if not available in-house. NFPE completed. Food allergies: peanuts     Nutrition Related Findings:    BM 11/19.  +edema. Pertinent meds: MVI, aldactone, lasix, antibiotic. Pertinent labs reviewed. BUN/ Cr 33/ 1.21 high/ low;  GFR 41 low.  Wound Type: None

## 2023-11-21 NOTE — PROGRESS NOTES
[facility] Hospitalist Group  Progress Note    Patient: Milton Asher Age: 80 y.o. : 1928 MR#: [id number] SSN: [id number]  Date/Time: 2023    Subjective:     Patient seen and examined at bedside, she reports she feels better than yesterday. Appetite good. She is constipated, last bowel movement a couple of days ago. Not sure when she last passed gas. Denies chest pain, shortness of breath, abdominal pain. Extensive discussion held with daughter and patient regarding rehab, they agree. Daughter at bedside requests a tour of ARU, liaison Ronal Cuevas aware. Dispo: pending sepsis workup, transition to noac when ok w/ all subspecialists. ARU referral initiated. Assessment/Plan:     1. Sepsis POA (fever, leukocytosis), source most likely urine. Follow urine culture. blood cultures  ngtd. MRSA culture screen pending. 2. Small pleural effusions  3. Pulmonary emboli, DVTs on heparin gtt. transition to NOAC per pulmonology, input appreciated. 4. Chronic urinary retention, now with Bah, did CIC in the past.  Recommend to leave Bah in place at this time given recent surgery, will be uncomfortable for patient in the setting of recent hip surgery  5. History of atrial fib, on atenolol and heparin drip. 6.  hypertension  7. Catheter associated cystitis. Urinalysis, urine culture. Urine culture was also sent from [facility], results pending at this time, we will follow. 8.  Recent hip fracture status postrepair at outside hospital . Consult wound regarding dressing change. 9.  Acute hypoxic respiratory failure POA, in the setting of acute PE.  10.Status post pacemaker   11. Macrocytic anemia  12. Groundglass opacities on CT, diffuse. Pulmonology follows. 15.  Advanced age. Prior to hip fracture, patient required no assistance with ADLs. 14.  Cardiomyopathy, abnormal EKG. Echo noted. 15.  Preserved EF on echo Echo. Grade 3 diastolic dysfunction. LA Volume Index A/L 75 16 - 34 mL/m2    LVOT Stroke Volume Index 34.0 mL/m2    LVOT Area 2.3 cm2    LA Volume Index A-L A2C 51 (A) 16 - 34 mL/m2    LA Volume Index A-L A4C 85 (A) 16 - 34 mL/m2    LA Volume Index MOD A2C 50 (A) 16 - 34 ml/m2    LA Volume Index MOD A4C 75 (A) 16 - 34 ml/m2    Ao Root Index 1.76 cm/m2    Ascending Aorta Index 2.03 cm/m2    AV Velocity Ratio 0.83     LVOT:AV VTI Index 0.81     ANISHA/BSA VTI 1.2 cm2/m2    ANISHA/BSA Peak Velocity 1.1 cm2/m2    MV:LVOT VTI Index 1.78    APTT    Collection Time: 11/20/23  9:30 PM   Result Value Ref Range    .9 (H) 23.0 - 36.4 SEC   CBC with Auto Differential    Collection Time: 11/21/23  3:57 AM   Result Value Ref Range    WBC 7.6 4.6 - 13.2 K/uL    RBC 2.47 (L) 4.20 - 5.30 M/uL    Hemoglobin 8.6 (L) 12.0 - 16.0 g/dL    Hematocrit 25.6 (L) 35.0 - 45.0 %    .6 (H) 78.0 - 100.0 FL    MCH 34.8 (H) 24.0 - 34.0 PG    MCHC 33.6 31.0 - 37.0 g/dL    RDW 17.4 (H) 11.6 - 14.5 %    Platelets 143 303 - 394 K/uL    MPV 10.3 9.2 - 11.8 FL    Nucleated RBCs 0.4 (H) 0  WBC    nRBC 0.03 (H) 0.00 - 0.01 K/uL    Neutrophils % 73 40 - 73 %    Lymphocytes % 14 (L) 21 - 52 %    Monocytes % 8 3 - 10 %    Eosinophils % 4 0 - 5 %    Basophils % 0 0 - 2 %    Immature Granulocytes 1 (H) 0.0 - 0.5 %    Neutrophils Absolute 5.6 1.8 - 8.0 K/UL    Lymphocytes Absolute 1.0 0.9 - 3.6 K/UL    Monocytes Absolute 0.6 0.05 - 1.2 K/UL    Eosinophils Absolute 0.3 0.0 - 0.4 K/UL    Basophils Absolute 0.0 0.0 - 0.1 K/UL    Absolute Immature Granulocyte 0.1 (H) 0.00 - 0.04 K/UL    Differential Type AUTOMATED     Vancomycin Level, Random    Collection Time: 11/21/23  3:57 AM   Result Value Ref Range    Vancomycin Rm 18.8 5.0 - 40.0 UG/ML   Basic Metabolic Panel    Collection Time: 11/21/23  3:57 AM   Result Value Ref Range    Sodium 136 136 - 145 mmol/L    Potassium 3.5 3.5 - 5.5 mmol/L    Chloride 103 100 - 111 mmol/L    CO2 28 21 - 32 mmol/L    Anion Gap 5 3.0 - 18 mmol/L

## 2023-11-21 NOTE — PROGRESS NOTES
Infectious Disease Consultation Note        Reason: Sepsis    Current abx Prior abx   Zosyn, vancomycin      Lines:       Assessment :  89 y.o.  female with a past medical history of HTN, urinary retention with chronic sanchez s/p intermittent straight cath,  recurrent UTI's, afib not on anticoagulation who presented to  [facility] on 11/20/23  with fever and hypoxia. Clinical presentation c/w sepsis (POA) due to catheter associated cystitis    Rule out intercurrent viral infection, secondary bloodstream infection    Significant pyuria noted on urinalysis 11/6 along with history of contamination of Sanchez catheter due to stool, fever/chills likely suggestive of catheter associated cystitis    Acute hypoxic respiratory failure -present on admission could be due to sepsis in the setting of acute pulmonary embolism     Bilateral pulm embolism-likely due to undiagnosed DVT, immobility from recent infection    Recommendations:    Continue cefepime, vancomycin for now  Follow up urine culture   Follow blood cultures   recommend Sanchez removal, performing intermittent straight cath once patient able to perform this  Management of pulm embolism per primary team.  Consider pulmonary evaluation  Monitor CBC, temperature, clinically        Above plan was discussed in details with daughter at bedside, RN and dr Abdulkadir Arora. Please call me if any further questions or concerns. Will continue to participate in the care of this patient. HPI:    Patient denies any subjective fever, chills. She denies any abdominal pain. No past medical history on file. No past surgical history on file.     [email or web address]    Current Facility-Administered Medications   Medication Dose Route Frequency    thiamine mononitrate tablet 100 mg  100 mg Oral Daily    polyethylene glycol (GLYCOLAX) packet 17 g  17 g Oral Daily    sennosides-docusate sodium (SENOKOT-S) 8.6-50 MG tablet 2 tablet  2 tablet Oral Nightly    aspirin EC tablet 81 mg  81 mg Oral Daily    spironolactone (ALDACTONE) tablet 25 mg  25 mg Oral Daily    sodium chloride flush 0.9 % injection 5-40 mL  5-40 mL IntraVENous 2 times per day    sodium chloride flush 0.9 % injection 5-40 mL  5-40 mL IntraVENous PRN    0.9 % sodium chloride infusion   IntraVENous PRN    potassium chloride 10 mEq/100 mL IVPB (Peripheral Line)  10 mEq IntraVENous PRN    magnesium sulfate 2000 mg in 50 mL IVPB premix  2,000 mg IntraVENous PRN    ondansetron (ZOFRAN-ODT) disintegrating tablet 4 mg  4 mg Oral Q8H PRN    Or    ondansetron (ZOFRAN) injection 4 mg  4 mg IntraVENous Q6H PRN    acetaminophen (TYLENOL) tablet 650 mg  650 mg Oral Q6H PRN    Or    acetaminophen (TYLENOL) suppository 650 mg  650 mg Rectal Q6H PRN    heparin (porcine) injection 4,430 Units  80 Units/kg IntraVENous PRN    heparin (porcine) injection 2,220 Units  40 Units/kg IntraVENous PRN    heparin 25,000 units in dextrose 5% 250 mL (premix) infusion  5-30 Units/kg/hr IntraVENous Continuous    atenolol (TENORMIN) tablet 50 mg  50 mg Oral Daily    vancomycin (VANCOCIN) 750 mg in sodium chloride 0.9 % 250 mL IVPB (vial-mate)  750 mg IntraVENous Q36H    cefepime (MAXIPIME) 1,000 mg in sodium chloride 0.9 % 50 mL IVPB (mini-bag)  1,000 mg IntraVENous Q12H    ipratropium 0.5 mg-albuterol 2.5 mg (DUONEB) nebulizer solution 1 Dose  1 Dose Inhalation Q4H PRN    multivitamin 1 tablet  1 tablet Oral Daily       Allergies: Peanut-containing drug products    No family history on file. Social History     Socioeconomic History    Marital status:       Spouse name: Not on file    Number of children: Not on file    Years of education: Not on file    Highest education level: Not on file   Occupational History    Not on file   Tobacco Use    Smoking status: Former     Types: Cigarettes    Smokeless tobacco: Never   Substance and Sexual Activity    Alcohol use: Not on file    Drug use: Never    Sexual activity: Not on file   Other Topics Concern    Not on file

## 2023-11-21 NOTE — PROGRESS NOTES
ARU/IPR REFERRAL CONTACT NOTE  [address] [place] [address] Physical Rehabilitation      Thank you for the opportunity to review this patient's case for admission to [address] [place] [address] Physical Rehabilitation. Based on our pre-admission screening:     [ x] Our Team/Medical Director is following this case. Comments: Referral received. Met with patient with daughter at the bedisde. Pt's son in law entered room shortly after introduction. Extensive conversation had with patient and family re: ARU. Daughter appeared to lead most of the conversation stating that they wanted to clarify medical needs and that she could take patient home to care for her with home health. Offered patient the opportunity to discuss possible ARU with family and would follow up with patient's choice. Advised care manager that will follow up with patient since she appears to benefit from ARU, however daughter appears to be requesting HH. Addendeum: Met with patient and family at the bedside. Daughter interested in touring the ARU. Family appears more receptive to ARU. Pending medical clearance and follow up. Will follow up with patient and family in the tomorrow. Again, Thank you for this referral. Should you have any questions please do not hesitate to call.      Sincerely,  Vida [facility]  [phone]

## 2023-11-21 NOTE — PROGRESS NOTES
[facility]  Pulmonary, Critical Care, and Sleep Medicine    Name: Esteban Perea MRN: [id number]   : 1928 [facility]   Date: 2023        IMPRESSION:   Acute hypoxic respiratory failure requiring supplementary oxygen in the setting of acute PE and possible infectious process vs pulmonary edema  Acute pulmonary embolism in left lung, provoked, in the setting of recent fall, s/p surgical repair of hip fracture. Ron Bidding triad of recent injury, Stasis (bed bound), hypercoagulable state (not anticoagulated for afib). TTE w/o RHS. - Now anticoagulated on heparin gtt  Afib with controlled ventricular response. Pacemaker since 2017  Ground glass opacities on CT, diffuse , concerning for pneumonia vs pulmonary edema  Sepsis, fevers, in setting of UTI and ground glass opacities in lung. Procal elevated 1.44. Now on cefipime and Vancomycin  Acute DVT: acute non-occlusive Rt middle femoral vein DVT on PVL (23). Macrocytic anemia  Advanced age, No assistance with ADLs, living alone prior to fall/injury     Patient Active Problem List   Diagnosis    Recurrent UTI    Atrial fibrillation with controlled ventricular response ([address])    Incomplete bladder emptying    History of frequent urinary tract infections    Essential hypertension, benign    Microhematuria    Syncope and collapse    Urinary retention    UTI (urinary tract infection), uncomplicated    Sepsis ([address])    Pulmonary embolus (HCC)      PLAN:   PULM:  Acute respiratory failure management  Maintain aspiration precautions: HOB >30 degrees  SpO2 goal >92% via nasal canula. Monitor respiratory status, concern for developing ARDS given CXR. Minimal oxygen requirement at this time. Diuretic management per primary service  Bronchial /oral hygiene.  IS at bedside  Bronchodilators: As needed Duoneb     PE Management:  Continue anti-coagulation with heparin gtt and transition to NOAC - recc 3 months of treatment

## 2023-11-22 PROBLEM — E87.6 HYPOKALEMIA: Status: ACTIVE | Noted: 2023-11-22

## 2023-11-22 LAB
ANION GAP SERPL CALC-SCNC: 4 MMOL/L (ref 3–18)
APTT PPP: 74.5 SEC (ref 23–36.4)
BUN SERPL-MCNC: 28 MG/DL (ref 7–18)
BUN/CREAT SERPL: 28 (ref 12–20)
CALCIUM SERPL-MCNC: 7.6 MG/DL (ref 8.5–10.1)
CHLORIDE SERPL-SCNC: 104 MMOL/L (ref 100–111)
CO2 SERPL-SCNC: 28 MMOL/L (ref 21–32)
CREAT SERPL-MCNC: 0.99 MG/DL (ref 0.6–1.3)
ECHO BSA: 1.54 M2
ERYTHROCYTE [DISTWIDTH] IN BLOOD BY AUTOMATED COUNT: 17.8 % (ref 11.6–14.5)
GLUCOSE SERPL-MCNC: 99 MG/DL (ref 74–99)
HCT VFR BLD AUTO: 24.8 % (ref 35–45)
HGB BLD-MCNC: 8.2 G/DL (ref 12–16)
MCH RBC QN AUTO: 35.3 PG (ref 24–34)
MCHC RBC AUTO-ENTMCNC: 33.1 G/DL (ref 31–37)
MCV RBC AUTO: 106.9 FL (ref 78–100)
NRBC # BLD: 0 K/UL (ref 0–0.01)
NRBC BLD-RTO: 0 PER 100 WBC
PLATELET # BLD AUTO: 187 K/UL (ref 135–420)
PMV BLD AUTO: 10.3 FL (ref 9.2–11.8)
POTASSIUM SERPL-SCNC: 3.4 MMOL/L (ref 3.5–5.5)
PROCALCITONIN SERPL-MCNC: 1.11 NG/ML
RBC # BLD AUTO: 2.32 M/UL (ref 4.2–5.3)
SODIUM SERPL-SCNC: 136 MMOL/L (ref 136–145)
VANCOMYCIN SERPL-MCNC: 15.9 UG/ML (ref 5–40)
WBC # BLD AUTO: 5.9 K/UL (ref 4.6–13.2)

## 2023-11-22 PROCEDURE — 97110 THERAPEUTIC EXERCISES: CPT

## 2023-11-22 PROCEDURE — 97535 SELF CARE MNGMENT TRAINING: CPT

## 2023-11-22 PROCEDURE — 36415 COLL VENOUS BLD VENIPUNCTURE: CPT

## 2023-11-22 PROCEDURE — 99232 SBSQ HOSP IP/OBS MODERATE 35: CPT | Performed by: HOSPITALIST

## 2023-11-22 PROCEDURE — APPSS15 APP SPLIT SHARED TIME 0-15 MINUTES

## 2023-11-22 PROCEDURE — 6370000000 HC RX 637 (ALT 250 FOR IP): Performed by: INTERNAL MEDICINE

## 2023-11-22 PROCEDURE — 6360000002 HC RX W HCPCS: Performed by: HOSPITALIST

## 2023-11-22 PROCEDURE — 97530 THERAPEUTIC ACTIVITIES: CPT

## 2023-11-22 PROCEDURE — 6360000002 HC RX W HCPCS: Performed by: INTERNAL MEDICINE

## 2023-11-22 PROCEDURE — 2580000003 HC RX 258: Performed by: INTERNAL MEDICINE

## 2023-11-22 PROCEDURE — 93970 EXTREMITY STUDY: CPT | Performed by: STUDENT IN AN ORGANIZED HEALTH CARE EDUCATION/TRAINING PROGRAM

## 2023-11-22 PROCEDURE — 80048 BASIC METABOLIC PNL TOTAL CA: CPT

## 2023-11-22 PROCEDURE — 6370000000 HC RX 637 (ALT 250 FOR IP): Performed by: HOSPITALIST

## 2023-11-22 PROCEDURE — 84145 PROCALCITONIN (PCT): CPT

## 2023-11-22 PROCEDURE — 85027 COMPLETE CBC AUTOMATED: CPT

## 2023-11-22 PROCEDURE — 2700000000 HC OXYGEN THERAPY PER DAY

## 2023-11-22 PROCEDURE — 80202 ASSAY OF VANCOMYCIN: CPT

## 2023-11-22 PROCEDURE — 99232 SBSQ HOSP IP/OBS MODERATE 35: CPT | Performed by: INTERNAL MEDICINE

## 2023-11-22 PROCEDURE — 1100000000 HC RM PRIVATE

## 2023-11-22 PROCEDURE — 85730 THROMBOPLASTIN TIME PARTIAL: CPT

## 2023-11-22 RX ORDER — FOLIC ACID 1 MG/1
1 TABLET ORAL DAILY
Status: DISCONTINUED | OUTPATIENT
Start: 2023-11-22 | End: 2023-11-24 | Stop reason: HOSPADM

## 2023-11-22 RX ORDER — FUROSEMIDE 20 MG/1
20 TABLET ORAL DAILY
Status: DISCONTINUED | OUTPATIENT
Start: 2023-11-23 | End: 2023-11-24 | Stop reason: HOSPADM

## 2023-11-22 RX ORDER — FUROSEMIDE 10 MG/ML
20 INJECTION INTRAMUSCULAR; INTRAVENOUS ONCE
Status: COMPLETED | OUTPATIENT
Start: 2023-11-22 | End: 2023-11-22

## 2023-11-22 RX ORDER — LANOLIN ALCOHOL/MO/W.PET/CERES
400 CREAM (GRAM) TOPICAL
Status: COMPLETED | OUTPATIENT
Start: 2023-11-22 | End: 2023-11-22

## 2023-11-22 RX ORDER — POTASSIUM CHLORIDE 20 MEQ/1
40 TABLET, EXTENDED RELEASE ORAL ONCE
Status: COMPLETED | OUTPATIENT
Start: 2023-11-22 | End: 2023-11-22

## 2023-11-22 RX ADMIN — Medication 100 MG: at 09:33

## 2023-11-22 RX ADMIN — APIXABAN 10 MG: 5 TABLET, FILM COATED ORAL at 22:12

## 2023-11-22 RX ADMIN — SODIUM CHLORIDE, PRESERVATIVE FREE 10 ML: 5 INJECTION INTRAVENOUS at 22:20

## 2023-11-22 RX ADMIN — DOCUSATE SODIUM 50 MG AND SENNOSIDES 8.6 MG 2 TABLET: 8.6; 5 TABLET, FILM COATED ORAL at 22:12

## 2023-11-22 RX ADMIN — SODIUM CHLORIDE, PRESERVATIVE FREE 10 ML: 5 INJECTION INTRAVENOUS at 09:32

## 2023-11-22 RX ADMIN — THERA TABS 1 TABLET: TAB at 09:32

## 2023-11-22 RX ADMIN — FUROSEMIDE 20 MG: 10 INJECTION, SOLUTION INTRAMUSCULAR; INTRAVENOUS at 12:28

## 2023-11-22 RX ADMIN — CEFEPIME 1000 MG: 1 INJECTION, POWDER, FOR SOLUTION INTRAMUSCULAR; INTRAVENOUS at 22:12

## 2023-11-22 RX ADMIN — ATENOLOL 50 MG: 25 TABLET ORAL at 09:32

## 2023-11-22 RX ADMIN — ASPIRIN 81 MG: 81 TABLET, COATED ORAL at 09:31

## 2023-11-22 RX ADMIN — POTASSIUM CHLORIDE 40 MEQ: 1500 TABLET, EXTENDED RELEASE ORAL at 12:28

## 2023-11-22 RX ADMIN — Medication 400 MG: at 12:28

## 2023-11-22 RX ADMIN — SPIRONOLACTONE 25 MG: 25 TABLET ORAL at 09:33

## 2023-11-22 RX ADMIN — APIXABAN 10 MG: 5 TABLET, FILM COATED ORAL at 11:30

## 2023-11-22 RX ADMIN — POLYETHYLENE GLYCOL 3350 17 G: 17 POWDER, FOR SOLUTION ORAL at 09:32

## 2023-11-22 RX ADMIN — CEFEPIME 1000 MG: 1 INJECTION, POWDER, FOR SOLUTION INTRAMUSCULAR; INTRAVENOUS at 08:40

## 2023-11-22 ASSESSMENT — PAIN SCALES - GENERAL
PAINLEVEL_OUTOF10: 0

## 2023-11-22 NOTE — PROGRESS NOTES
ARU/IPR REFERRAL CONTACT NOTE  [address] [place] [address] for Physical Rehabilitation      Thank you for the opportunity to review this patient's case for admission to [address] [place] [address] Physical Rehabilitation. Based on our pre-admission screening:     [x ] Our Team/Medical Director is following this case. Comments:Met with patient and daughter at bedside to follow up on DC to rehab and expectations of program.  Daughter was present in the room with service dog. Advised that dog must remain on leash or contained in a carrier under her control at all times while on the ARU. Daughter reported understanding. Daughter requested thera band to assist patient with exercises. Offered to reach out to the acute care therapy team to provide appropriate materials. Again, Thank you for this referral. Should you have any questions please do not hesitate to call.      Sincerely,  Pipe Lira [facility]  [phone]

## 2023-11-22 NOTE — PROGRESS NOTES
[facility] Hospitalist Group  Progress Note    Patient: Goldy De La Cruz Age: 80 y.o. : 1928 MR#: [id number] SSN: [id number]  Date/Time: 2023    Subjective:     K 3.4. Labial swelling noted. Hb drifting down. Patient seen and examined at bedside, she denies chest pain, cough, shortness of breath, abdominal pain. Wants to go to rehab. Daughter at bedside. Assessment/Plan:     1. Sepsis POA (fever, leukocytosis), source most likely urine. 2. Small pleural effusions  3. Pulmonary emboli, DVTs on heparin gtt. transition to NOAC per pulmonology, input appreciated. 4. Chronic urinary retention, now with Bah, did CIC in the past.  Recommend to leave Bah in place at this time given recent surgery, will be uncomfortable for patient in the setting of recent hip surgery  5. History of atrial fib, on atenolol and heparin drip. 6.  hypertension  7. Catheter associated cystitis. po cefuroxime till 23, ID input appreciated. 8.  Recent hip fracture status postrepair at outside hospital . Consult wound regarding dressing change. 9.  Acute hypoxic respiratory failure POA, in the setting of acute PE, resolving. 10.Status post pacemaker   11. Macrocytic anemia. Supplement folate. Check fe studies. 12.  Groundglass opacities on CT, diffuse. Pulmonology follows. 15.  Advanced age. Prior to hip fracture, patient required no assistance with ADLs. 14.  Cardiomyopathy, abnormal EKG. Echo noted. Lasix. 15.  Preserved EF on echo Echo. Grade 3 diastolic dysfunction. Moderate MR, mild stenosis. Mild TR. Left atrium severely dilated. Mild pulmonary hypertension. Left pleural effusion.   16. Severe malnutrition (23 1311)    Context: Acute Illness  Findings of clinical characteristics of malnutrition:   Energy Intake:  Mild decrease in energy intake (Comment) (usually eats small meals)  Weight Loss:  Unable to assess     Body Fat Loss: WBC 5.9 4.6 - 13.2 K/uL    RBC 2.32 (L) 4.20 - 5.30 M/uL    Hemoglobin 8.2 (L) 12.0 - 16.0 g/dL    Hematocrit 24.8 (L) 35.0 - 45.0 %    .9 (H) 78.0 - 100.0 FL    MCH 35.3 (H) 24.0 - 34.0 PG    MCHC 33.1 31.0 - 37.0 g/dL    RDW 17.8 (H) 11.6 - 14.5 %    Platelets 727 380 - 796 K/uL    MPV 10.3 9.2 - 11.8 FL    Nucleated RBCs 0.0 0  WBC    nRBC 0.00 0.00 - 0.01 K/uL   APTT    Collection Time: 11/22/23  4:55 AM   Result Value Ref Range    PTT 74.5 (H) 23.0 - 36.4 SEC   Vancomycin Level, Random    Collection Time: 11/22/23  4:55 AM   Result Value Ref Range    Vancomycin Rm 15.9 5.0 - 40.0 UG/ML   Basic Metabolic Panel    Collection Time: 11/22/23  4:55 AM   Result Value Ref Range    Sodium 136 136 - 145 mmol/L    Potassium 3.4 (L) 3.5 - 5.5 mmol/L    Chloride 104 100 - 111 mmol/L    CO2 28 21 - 32 mmol/L    Anion Gap 4 3.0 - 18 mmol/L    Glucose 99 74 - 99 mg/dL    BUN 28 (H) 7.0 - 18 MG/DL    Creatinine 0.99 0.6 - 1.3 MG/DL    Bun/Cre Ratio 28 (H) 12 - 20      Est, Glom Filt Rate 53 (L) >60 ml/min/1.73m2    Calcium 7.6 (L) 8.5 - 10.1 MG/DL     Additional Data Reviewed:      Signed By: Haritha Mcconnell MD     November 22, 2023 1:36 PM

## 2023-11-22 NOTE — PROGRESS NOTES
[facility]  Pulmonary, Critical Care, and Sleep Medicine    Name: Rosa Paul MRN: [id number]   : 1928 [facility]   Date: 2023        IMPRESSION:   Acute hypoxic respiratory failure requiring supplementary oxygen in the setting of acute PE and possible infectious process vs pulmonary edema  Acute pulmonary embolism in left lung, provoked, in the setting of recent fall, s/p surgical repair of hip fracture. Antoni Morgan triad of recent injury, Stasis (bed bound), hypercoagulable state (not anticoagulated for afib). TTE w/o RHS. S/p anticoagulated on heparin gtt, and transitioned to eliquis  Acute DVT: acute non-occlusive Rt middle femoral vein DVT on PVL (23). Afib with controlled ventricular response. Pacemaker since 2017  Ground glass opacities on CT, diffuse , concerning for pneumonia vs pulmonary edema  Sepsis, fevers, in setting of and ground glass opacities in lung concerning for pneumonia vs less likely cystitis, UTI (urinalysis on  positive nitrites, Leuk esterase, bacteruria and WBC's, urine culture negative) Procal elevated 1.44. Now on cefipime and Vancomycin  Macrocytic anemia, B12/follat WNL  Advanced age, No assistance with ADLs, living alone prior to fall/injury  Debility     Patient Active Problem List   Diagnosis    Recurrent UTI    Atrial fibrillation with controlled ventricular response ([address])    Incomplete bladder emptying    History of frequent urinary tract infections    Essential hypertension, benign    Microhematuria    Syncope and collapse    Urinary retention    Urinary tract infection associated with indwelling urethral catheter (HCC)    Sepsis (HCC)    Pulmonary embolus (HCC)    Severe protein-calorie malnutrition (HCC)      PLAN:   PULM:  Acute respiratory failure management  Maintain aspiration precautions: HOB >30 degrees  SpO2 goal >92% via nasal canula.  Monitor respiratory status, concern for developing ARDS given

## 2023-11-22 NOTE — PLAN OF CARE
Problem: Safety - Adult  Goal: Free from fall injury  Outcome: Progressing     Problem: Skin/Tissue Integrity  Goal: Absence of new skin breakdown  Description: 1. Monitor for areas of redness and/or skin breakdown  2. Assess vascular access sites hourly  3. Every 4-6 hours minimum:  Change oxygen saturation probe site  4. Every 4-6 hours:  If on nasal continuous positive airway pressure, respiratory therapy assess nares and determine need for appliance change or resting period.   Outcome: Progressing     Problem: Nutrition Deficit:  Goal: Optimize nutritional status  Outcome: Progressing     Problem: ABCDS Injury Assessment  Goal: Absence of physical injury  Outcome: Progressing

## 2023-11-22 NOTE — CARE COORDINATION
Received a v/m from Evelina ([phone]) Admission Coordinator for [facility] asking for a return call. Called Evelina gave her the  fax number so that she can fax a copy of patient's POA. Received a copy of patient's POA and scanned into Epic. Informed Andre Dc that information was going to faxed to  main office and will be scanned into Epic.

## 2023-11-22 NOTE — PROGRESS NOTES
Attempted pt for OT tx X 2. First attempt pt w/ NSG care and on second attempt pt's daughter not allowing therapy in room. Will continue to follow.  Thank you  HOMA Chacon/MORGAN

## 2023-11-22 NOTE — PROGRESS NOTES
Physical Therapy  Attempted to see patient for PT treatment as soon as MD and nurse were leaving the room. Patient's daughter would not allow therapy in the room, closing the room door in therapy's face when they attempted to explain why they needed to see her mother. Will re-attempt later in the day.   Otoniel Olson, PT

## 2023-11-22 NOTE — PLAN OF CARE
Problem: Occupational Therapy - Adult  Goal: By Discharge: Performs self-care activities at highest level of function for planned discharge setting. See evaluation for individualized goals. Description: Occupational Therapy Goals:  Initiated 11/20/2023 to be met within 7-10 days. 1.  Patient will perform bed mobility in preparation for ADLs with supervision/set-up. 2.  Patient will perform grooming tasks and upper body dressing with modified independence sitting EOB, good balance. 3.  Patient will perform toilet transfers with minimal assistance/contact guard assist.  3.  Patient will perform all aspects of toileting with minimal assistance/contact guard assist.  4.  Patient will participate in upper extremity therapeutic exercise/activities with modified independence for 8-10 minutes to increase strength/endurance for ADLs. 5.  Patient will utilize energy conservation techniques during functional activities with verbal cues. PLOF: Patient underwent R hip surgery and has been in SNF since with minimal therapy. Prior to surgery, pt reports she was independent with self-care and functional mobility PTA, still driving. Outcome: Progressing   OCCUPATIONAL THERAPY TREATMENT    Patient: Javier Fofana (54 y.o. female)  Date: 11/22/2023  Diagnosis: Sepsis ([address]) [A41.9] Sepsis ([address])      Precautions: Fall Risk, Weight Bearing, Right Lower Extremity Weight Bearing: Weight Bearing As Tolerated,  ,  ,  ,  ,  ,      Chart, occupational therapy assessment, plan of care, and goals were reviewed. ASSESSMENT:  Pt presented supine in bed upon entry with daughter at bedside. Pt transitioned to EOB CGA w/ increased time for R LE mgmt in prep for functional tasks. Once sitting, she required MAX A donning her socks 2/2 generalized weakness. Reviewed WB status on R LE. STS transfer MIN A with RW and maneuvered to reclining chair ~ 5 ft MIN A with RW, simulating BSC transfer.  Once sitting in chair, pt engaged in oral care w/ S/U. She was positioned for comfort at end of session and left with BLE's elevated and all needs within reach. Encouraged pt to sit up ~ 1-2 hours for increased strength and endurance needed for self cares. RN made aware. Progression toward goals:  []          Improving appropriately and progressing toward goals  [x]          Improving slowly and progressing toward goals  []          Not making progress toward goals and plan of care will be adjusted     PLAN:  Patient continues to benefit from skilled intervention to address the above impairments. Continue treatment per established plan of care. Further Equipment Recommendations for Discharge: RW and shower chair    AMPAC: AM-PAC Inpatient Daily Activity Raw Score: 16    Current research shows that an AM-PAC score of 17 or less is not associated with a discharge to the patient's home setting. Based on an AM-PAC score and their current ADL deficits; it is recommended that the patient have 5-7 sessions per week of Occupational Therapy at d/c to increase the patient's independence. Currently, this patient demonstrates the potential endurance, and/or tolerance for 3 hours of therapy each day at d/c. This AMPAC score should be considered in conjunction with interdisciplinary team recommendations to determine the most appropriate discharge setting. Patient's social support, diagnosis, medical stability, and prior level of function should also be taken into consideration. SUBJECTIVE:   Patient stated, \"I will try. \"    OBJECTIVE DATA SUMMARY:     Functional Mobility and Transfers for ADLs:   Bed Mobility:  Bed Mobility Training  Supine to Sit: Contact-guard assistance; Additional time (heavy reliance on bed rails)  Scooting: Supervision   Transfers:  Transfer Training  Transfer Training: Yes  Sit to Stand: Minimum assistance  Stand to Sit: Minimum assistance  Bed to Chair: Minimum assistance; Adaptive equipment    Balance:  Balance  Sitting -

## 2023-11-22 NOTE — PLAN OF CARE
Problem: Physical Therapy - Adult  Goal: By Discharge: Performs mobility at highest level of function for planned discharge setting. See evaluation for individualized goals. Description: Physical Therapy Goals:  Initiated 11/20/2023 to be met within 7-10 days. 1.  Patient will move from supine to sit and sit to supine  in bed with contact guard assistance. 2.  Patient will transfer from bed to chair and chair to bed with minimal assistance using the least restrictive device. 3.  Patient will perform sit to stand with minimal assistance. 4.  Patient will ambulate with modified minimal assistance for 50 feet with the least restrictive device. 5.  Patient will perform LE exercises to increase strength/ROM for functional tasks    PLOF: Patient is coming from rehab facility following right hip surgery. She was independent with ADL's and mobility. Lives alone. Outcome: Progressing   PHYSICAL THERAPY TREATMENT    Patient: Rogerio Raymundo (68 y.o. female)  Date: 11/22/2023  Diagnosis: Sepsis ([address]) [A41.9] Sepsis ([address])      Precautions: Fall Risk, Weight Bearing, Right Lower Extremity Weight Bearing: Weight Bearing As Tolerated,     ASSESSMENT:  Pt in bed and agreeable to PT treatment session. Pt performed LE exercises as noted below. Pt transferred supine to sit with CGA and increased time with heavy use of the bed rail. Pt demonstrated good static sitting balance at the edge of the bed. Pt stood with min A and ambulated 5 feet with RW and min A with decreased step length, anna and foot clearance. Pt was left sitting in recliner with needs in reach, daughter present and nursing notified.      Progression toward goals:   [x]      Improving appropriately and progressing toward goals  []      Improving slowly and progressing toward goals  []      Not making progress toward goals and plan of care will be adjusted     PLAN:  Patient continues to benefit from skilled intervention to address the above ROM   Comments   Ankle Pumps 1 10  [x] [] [] []    Quad Sets/Glut Sets 1 5  [x] [] [] [] Hold for 5 secs   Hamstring Sets   [] [] [] []    Short Arc Quads   [] [] [] []    Heel Slides 1 5 [x] [] [] []    Straight Leg Raises   [] [] [] []    Hip abd/Add 1 5 [x] [x] [] []    Long Arc Quads 1 5 [x] [] [] []    Seated Marching   [] [] [] []    Standing Marching   [] [] [] []       [] [] [] []        Pain:  Pain level pre-treatment: 4/10  Pain level post-treatment: 6/10   Pain Intervention(s): Rest, Repositioning   Response to intervention: Nurse notified    Activity Tolerance:   Activity Tolerance: Patient tolerated treatment well  Please refer to the flowsheet for vital signs taken during this treatment. After treatment:   [x] Patient left in no apparent distress sitting up in chair  [] Patient left in no apparent distress in bed  [x] Call bell left within reach  [x] Nursing notified  [x] Caregiver present  [] Bed alarm activated  [] SCDs applied      COMMUNICATION/EDUCATION:   Patient Education  Education Given To: Patient  Education Provided: Plan of Care;Home Exercise Program;Transfer Training; Fall Prevention Strategies  Education Method: Demonstration;Verbal;Teach Back  Barriers to Learning: None  Education Outcome: Verbalized understanding;Demonstrated understanding;Continued education needed      Flower Tay PT  Minutes: 33

## 2023-11-22 NOTE — PROGRESS NOTES
Infectious Disease progress Note        Reason: Sepsis    Current abx Prior abx   Vancomycin  cefepime Zosyn     Lines:       Assessment :  89 y.o.  female with a past medical history of HTN, urinary retention with chronic sanchez s/p intermittent straight cath,  recurrent UTI's, afib not on anticoagulation who presented to [facility] on 11/20/23  with fever and hypoxia. Clinical presentation c/w SIRS response to acute pulmonary embolism, DVT; probable catheter associated cystitis     sepsis ruled out. Minimal pyuria on UA 11/20 and negative urine cx 11/20 argues against cystitis. Antibiotics prior to obtaining urine cultures could lead to false negative urine culture 11/20. Hence, will give short course of antibiotics for probable cystitis      Fevers on presentation could have been due to DVT (patient denies any chills prior to presentation. Reported to have chills per daughter)    Acute hypoxic respiratory failure -present on admission could be due to sepsis in the setting of acute pulmonary embolism     Bilateral pulm embolism-likely due to  DVT, immobility from recent surgery    DVT right middle femoral vein, right distal femoral vein/popliteal vein noted on duplex 11/21/23    Recommendations:    D/c cefepime, vancomycin - start po cefuroxime till 11/25/23  recommend Sanchez removal, performing intermittent straight cath once patient able to perform this  Management of pulm embolism per  pulmonary evaluation  Monitor CBC, temperature, clinically        Above plan was discussed in details with dr Donna Ozuna. time spent greater than 20 min. Please call me if any further questions or concerns. Will continue to participate in the care of this patient. HPI:        No past medical history on file. No past surgical history on file.     @[facility]@    Current Facility-Administered Medications   Medication Dose Route Frequency    thiamine mononitrate tablet 100 mg  100 mg Oral Daily    polyethylene glycol Order Status: Completed Specimen: Blood Updated: 11/22/23 0638     Special Requests --        NO SPECIAL REQUESTS  LEFT  RIGHT  HAND       Culture NO GROWTH 2 DAYS       Culture, Blood 2 [[id number]] Collected: 11/20/23 0928    Order Status: Completed Specimen: Blood Updated: 11/22/23 0638     Special Requests NO SPECIAL REQUESTS        Culture NO GROWTH 2 DAYS       Culture, Urine [[id number]] Collected: 11/20/23 0700    Order Status: Canceled Specimen: Urine, clean catch                 RADIOLOGY:    All available imaging studies/reports in [facility] for this admission were reviewed      Disclaimer: Sections of this note are dictated utilizing voice recognition software, which may have resulted in some phonetic based errors in grammar and contents. Even though attempts were made to correct all the mistakes, some may have been missed, and remained in the body of the document. If questions arise, please contact our department.     Dr. Rossana Colvin, Infectious Disease Specialist  [phone]  November 22, 2023  9:50 AM

## 2023-11-23 LAB
ANION GAP SERPL CALC-SCNC: 4 MMOL/L (ref 3–18)
BASOPHILS # BLD: 0 K/UL (ref 0–0.1)
BASOPHILS NFR BLD: 0 % (ref 0–2)
BUN SERPL-MCNC: 28 MG/DL (ref 7–18)
BUN/CREAT SERPL: 29 (ref 12–20)
CALCIUM SERPL-MCNC: 8 MG/DL (ref 8.5–10.1)
CHLORIDE SERPL-SCNC: 104 MMOL/L (ref 100–111)
CO2 SERPL-SCNC: 27 MMOL/L (ref 21–32)
CREAT SERPL-MCNC: 0.97 MG/DL (ref 0.6–1.3)
DIFFERENTIAL METHOD BLD: ABNORMAL
EOSINOPHIL # BLD: 0.2 K/UL (ref 0–0.4)
EOSINOPHIL NFR BLD: 3 % (ref 0–5)
ERYTHROCYTE [DISTWIDTH] IN BLOOD BY AUTOMATED COUNT: 17.5 % (ref 11.6–14.5)
FERRITIN SERPL-MCNC: 344 NG/ML (ref 8–388)
GLUCOSE SERPL-MCNC: 91 MG/DL (ref 74–99)
HCT VFR BLD AUTO: 26 % (ref 35–45)
HGB BLD-MCNC: 8.5 G/DL (ref 12–16)
IMM GRANULOCYTES # BLD AUTO: 0 K/UL (ref 0–0.04)
IMM GRANULOCYTES NFR BLD AUTO: 1 % (ref 0–0.5)
IRON SATN MFR SERPL: 50 % (ref 20–50)
IRON SERPL-MCNC: 90 UG/DL (ref 50–175)
LYMPHOCYTES # BLD: 0.9 K/UL (ref 0.9–3.6)
LYMPHOCYTES NFR BLD: 18 % (ref 21–52)
MAGNESIUM SERPL-MCNC: 1.7 MG/DL (ref 1.6–2.6)
MCH RBC QN AUTO: 34.8 PG (ref 24–34)
MCHC RBC AUTO-ENTMCNC: 32.7 G/DL (ref 31–37)
MCV RBC AUTO: 106.6 FL (ref 78–100)
MONOCYTES # BLD: 0.5 K/UL (ref 0.05–1.2)
MONOCYTES NFR BLD: 10 % (ref 3–10)
NEUTS SEG # BLD: 3.5 K/UL (ref 1.8–8)
NEUTS SEG NFR BLD: 68 % (ref 40–73)
NRBC # BLD: 0 K/UL (ref 0–0.01)
NRBC BLD-RTO: 0 PER 100 WBC
PLATELET # BLD AUTO: 199 K/UL (ref 135–420)
PMV BLD AUTO: 10.4 FL (ref 9.2–11.8)
POTASSIUM SERPL-SCNC: 4 MMOL/L (ref 3.5–5.5)
RBC # BLD AUTO: 2.44 M/UL (ref 4.2–5.3)
SODIUM SERPL-SCNC: 135 MMOL/L (ref 136–145)
TIBC SERPL-MCNC: 179 UG/DL (ref 250–450)
WBC # BLD AUTO: 5.1 K/UL (ref 4.6–13.2)

## 2023-11-23 PROCEDURE — 83540 ASSAY OF IRON: CPT

## 2023-11-23 PROCEDURE — 36415 COLL VENOUS BLD VENIPUNCTURE: CPT

## 2023-11-23 PROCEDURE — 2580000003 HC RX 258: Performed by: INTERNAL MEDICINE

## 2023-11-23 PROCEDURE — 6360000002 HC RX W HCPCS: Performed by: INTERNAL MEDICINE

## 2023-11-23 PROCEDURE — 82728 ASSAY OF FERRITIN: CPT

## 2023-11-23 PROCEDURE — 6370000000 HC RX 637 (ALT 250 FOR IP): Performed by: INTERNAL MEDICINE

## 2023-11-23 PROCEDURE — 83550 IRON BINDING TEST: CPT

## 2023-11-23 PROCEDURE — 83735 ASSAY OF MAGNESIUM: CPT

## 2023-11-23 PROCEDURE — 85025 COMPLETE CBC W/AUTO DIFF WBC: CPT

## 2023-11-23 PROCEDURE — 6370000000 HC RX 637 (ALT 250 FOR IP): Performed by: HOSPITALIST

## 2023-11-23 PROCEDURE — 1100000000 HC RM PRIVATE

## 2023-11-23 PROCEDURE — 99232 SBSQ HOSP IP/OBS MODERATE 35: CPT | Performed by: HOSPITALIST

## 2023-11-23 PROCEDURE — 80048 BASIC METABOLIC PNL TOTAL CA: CPT

## 2023-11-23 RX ADMIN — POLYETHYLENE GLYCOL 3350 17 G: 17 POWDER, FOR SOLUTION ORAL at 08:58

## 2023-11-23 RX ADMIN — Medication 100 MG: at 08:58

## 2023-11-23 RX ADMIN — DOCUSATE SODIUM 50 MG AND SENNOSIDES 8.6 MG 2 TABLET: 8.6; 5 TABLET, FILM COATED ORAL at 21:57

## 2023-11-23 RX ADMIN — ASPIRIN 81 MG: 81 TABLET, COATED ORAL at 08:58

## 2023-11-23 RX ADMIN — SODIUM CHLORIDE, PRESERVATIVE FREE 10 ML: 5 INJECTION INTRAVENOUS at 09:06

## 2023-11-23 RX ADMIN — FOLIC ACID 1 MG: 1 TABLET ORAL at 08:58

## 2023-11-23 RX ADMIN — SPIRONOLACTONE 25 MG: 25 TABLET ORAL at 08:58

## 2023-11-23 RX ADMIN — ATENOLOL 50 MG: 25 TABLET ORAL at 08:58

## 2023-11-23 RX ADMIN — CEFEPIME 1000 MG: 1 INJECTION, POWDER, FOR SOLUTION INTRAMUSCULAR; INTRAVENOUS at 08:58

## 2023-11-23 RX ADMIN — APIXABAN 10 MG: 5 TABLET, FILM COATED ORAL at 21:57

## 2023-11-23 RX ADMIN — FUROSEMIDE 20 MG: 20 TABLET ORAL at 08:58

## 2023-11-23 RX ADMIN — APIXABAN 10 MG: 5 TABLET, FILM COATED ORAL at 08:58

## 2023-11-23 RX ADMIN — CEFEPIME 1000 MG: 1 INJECTION, POWDER, FOR SOLUTION INTRAMUSCULAR; INTRAVENOUS at 21:52

## 2023-11-23 RX ADMIN — SODIUM CHLORIDE, PRESERVATIVE FREE 5 ML: 5 INJECTION INTRAVENOUS at 21:52

## 2023-11-23 RX ADMIN — THERA TABS 1 TABLET: TAB at 08:58

## 2023-11-23 ASSESSMENT — PAIN SCALES - GENERAL
PAINLEVEL_OUTOF10: 0

## 2023-11-23 NOTE — PROGRESS NOTES
Pt ambulated from chair to bedside commode, large BM noted. Pt assisted x2 back to bed 0 c/o at this time, call bell within reach.

## 2023-11-23 NOTE — PROGRESS NOTES
Bedside and Verbal shift change report given to MISAEL Smallwood (oncoming nurse) by Ulysses Egan RN (offgoing nurse). Report included the following information Nurse Handoff Report, Index, Adult Overview, Surgery Report, Intake/Output, MAR, and Recent Results.

## 2023-11-23 NOTE — PROGRESS NOTES
[facility] Hospitalist Group  Progress Note    Patient: Esteban Perea Age: 80 y.o. : 1928 MR#: [id number] SSN: [id number]  Date/Time: 2023    Subjective:     Per chart documentation, patient had large bowel movement yesterday evening. Hb 8.5 this am.     Patient seen and examined at bedside, she reports she feels better from constipation standpoint. Feels like she still needs miralax daily. Denies chest pain, cough, shortness of breath. Appetite is good. Right hip healing well from her perspective. Nonproductive intermittent cough. Hopes she can go to rehab tomorrow morning. Assessment/Plan:     1. Sepsis POA (fever, leukocytosis), source most likely urine. 2. Small pleural effusions. ICS. 3. Pulmonary emboli, DVTs heparin gtt --> NOAC. 4. Chronic urinary retention, now with Bah, did CIC in the past.  Recommend to leave Bah in place at this time given recent surgery, will be uncomfortable for patient in the setting of recent hip surgery  5. History of atrial fib, on atenolol and noac. 6.  Hypertension, well controlled on BB.   7.  Catheter associated cystitis. po cefuroxime till 23, ID input appreciated. 8.  Recent hip fracture status postrepair at outside hospital. wound care recs noted regarding dressing change. 9.  Acute hypoxic respiratory failure POA, in the setting of acute PE, resolving. 10. Status post pacemaker   11. Macrocytic anemia. Supplement folate. fe studies, B12 ok. 12.  Groundglass opacities on CT, diffuse. Pulmonology follows. 15.  Advanced age. Prior to hip fracture, patient required no assistance with ADLs. 14.  Constipation improving. Continue bowel regimen. 15.  Preserved EF on echo. Grade 3 diastolic dysfunction. Moderate MR, mild stenosis. Mild TR. Left atrium severely dilated. Mild pulmonary hypertension. Left pleural effusion. Continue lasix.    16. Severe malnutrition (23 1311)    Context:

## 2023-11-24 ENCOUNTER — HOSPITAL ENCOUNTER (INPATIENT)
Facility: HOSPITAL | Age: 88
DRG: 871 | End: 2023-11-24
Attending: HOSPITALIST | Admitting: HOSPITALIST
Payer: MEDICARE

## 2023-11-24 VITALS
SYSTOLIC BLOOD PRESSURE: 122 MMHG | HEART RATE: 60 BPM | WEIGHT: 108.91 LBS | OXYGEN SATURATION: 96 % | BODY MASS INDEX: 20.56 KG/M2 | TEMPERATURE: 98.2 F | HEIGHT: 61 IN | DIASTOLIC BLOOD PRESSURE: 69 MMHG | RESPIRATION RATE: 18 BRPM

## 2023-11-24 DIAGNOSIS — I26.99 ACUTE PULMONARY EMBOLISM WITHOUT ACUTE COR PULMONALE, UNSPECIFIED PULMONARY EMBOLISM TYPE (HCC): ICD-10-CM

## 2023-11-24 DIAGNOSIS — A41.9 SEPSIS, DUE TO UNSPECIFIED ORGANISM, UNSPECIFIED WHETHER ACUTE ORGAN DYSFUNCTION PRESENT (HCC): Primary | ICD-10-CM

## 2023-11-24 PROBLEM — Z78.9 IMPAIRED MOBILITY AND ADLS: Status: ACTIVE | Noted: 2023-11-24

## 2023-11-24 PROBLEM — Z74.09 IMPAIRED MOBILITY AND ADLS: Status: ACTIVE | Noted: 2023-11-24

## 2023-11-24 LAB
ANION GAP SERPL CALC-SCNC: 4 MMOL/L (ref 3–18)
BASOPHILS # BLD: 0 K/UL (ref 0–0.1)
BASOPHILS NFR BLD: 0 % (ref 0–2)
BUN SERPL-MCNC: 32 MG/DL (ref 7–18)
BUN/CREAT SERPL: 26 (ref 12–20)
CALCIUM SERPL-MCNC: 8.1 MG/DL (ref 8.5–10.1)
CHLORIDE SERPL-SCNC: 103 MMOL/L (ref 100–111)
CO2 SERPL-SCNC: 29 MMOL/L (ref 21–32)
CREAT SERPL-MCNC: 1.24 MG/DL (ref 0.6–1.3)
DIFFERENTIAL METHOD BLD: ABNORMAL
EOSINOPHIL # BLD: 0.2 K/UL (ref 0–0.4)
EOSINOPHIL NFR BLD: 3 % (ref 0–5)
ERYTHROCYTE [DISTWIDTH] IN BLOOD BY AUTOMATED COUNT: 17.5 % (ref 11.6–14.5)
GLUCOSE SERPL-MCNC: 106 MG/DL (ref 74–99)
HCT VFR BLD AUTO: 25 % (ref 35–45)
HGB BLD-MCNC: 8.5 G/DL (ref 12–16)
IMM GRANULOCYTES # BLD AUTO: 0 K/UL (ref 0–0.04)
IMM GRANULOCYTES NFR BLD AUTO: 1 % (ref 0–0.5)
LYMPHOCYTES # BLD: 1.1 K/UL (ref 0.9–3.6)
LYMPHOCYTES NFR BLD: 18 % (ref 21–52)
MAGNESIUM SERPL-MCNC: 2 MG/DL (ref 1.6–2.6)
MCH RBC QN AUTO: 36.5 PG (ref 24–34)
MCHC RBC AUTO-ENTMCNC: 34 G/DL (ref 31–37)
MCV RBC AUTO: 107.3 FL (ref 78–100)
MONOCYTES # BLD: 0.6 K/UL (ref 0.05–1.2)
MONOCYTES NFR BLD: 10 % (ref 3–10)
NEUTS SEG # BLD: 4.2 K/UL (ref 1.8–8)
NEUTS SEG NFR BLD: 69 % (ref 40–73)
NRBC # BLD: 0 K/UL (ref 0–0.01)
NRBC BLD-RTO: 0 PER 100 WBC
PHOSPHATE SERPL-MCNC: 2.8 MG/DL (ref 2.5–4.9)
PLATELET # BLD AUTO: 199 K/UL (ref 135–420)
PMV BLD AUTO: 10.4 FL (ref 9.2–11.8)
POTASSIUM SERPL-SCNC: 4.1 MMOL/L (ref 3.5–5.5)
RBC # BLD AUTO: 2.33 M/UL (ref 4.2–5.3)
SODIUM SERPL-SCNC: 136 MMOL/L (ref 136–145)
WBC # BLD AUTO: 6.1 K/UL (ref 4.6–13.2)

## 2023-11-24 PROCEDURE — 85025 COMPLETE CBC W/AUTO DIFF WBC: CPT

## 2023-11-24 PROCEDURE — 97542 WHEELCHAIR MNGMENT TRAINING: CPT

## 2023-11-24 PROCEDURE — 99239 HOSP IP/OBS DSCHRG MGMT >30: CPT | Performed by: HOSPITALIST

## 2023-11-24 PROCEDURE — 36415 COLL VENOUS BLD VENIPUNCTURE: CPT

## 2023-11-24 PROCEDURE — 2580000003 HC RX 258: Performed by: INTERNAL MEDICINE

## 2023-11-24 PROCEDURE — 80048 BASIC METABOLIC PNL TOTAL CA: CPT

## 2023-11-24 PROCEDURE — 84100 ASSAY OF PHOSPHORUS: CPT

## 2023-11-24 PROCEDURE — 6360000002 HC RX W HCPCS: Performed by: INTERNAL MEDICINE

## 2023-11-24 PROCEDURE — 1180000000 HC REHAB R&B

## 2023-11-24 PROCEDURE — 6370000000 HC RX 637 (ALT 250 FOR IP): Performed by: HOSPITALIST

## 2023-11-24 PROCEDURE — 97530 THERAPEUTIC ACTIVITIES: CPT

## 2023-11-24 PROCEDURE — 6370000000 HC RX 637 (ALT 250 FOR IP): Performed by: INTERNAL MEDICINE

## 2023-11-24 PROCEDURE — 97535 SELF CARE MNGMENT TRAINING: CPT

## 2023-11-24 PROCEDURE — 97166 OT EVAL MOD COMPLEX 45 MIN: CPT

## 2023-11-24 PROCEDURE — 97162 PT EVAL MOD COMPLEX 30 MIN: CPT

## 2023-11-24 PROCEDURE — 99223 1ST HOSP IP/OBS HIGH 75: CPT | Performed by: HOSPITALIST

## 2023-11-24 PROCEDURE — 83735 ASSAY OF MAGNESIUM: CPT

## 2023-11-24 RX ORDER — ATENOLOL 50 MG/1
50 TABLET ORAL DAILY
Status: DISCONTINUED | OUTPATIENT
Start: 2023-11-25 | End: 2023-12-07 | Stop reason: HOSPADM

## 2023-11-24 RX ORDER — MULTIVITAMIN WITH IRON
1 TABLET ORAL DAILY
Status: DISCONTINUED | OUTPATIENT
Start: 2023-11-25 | End: 2023-12-07 | Stop reason: HOSPADM

## 2023-11-24 RX ORDER — ACETAMINOPHEN 325 MG/1
650 TABLET ORAL EVERY 4 HOURS PRN
Status: DISCONTINUED | OUTPATIENT
Start: 2023-11-24 | End: 2023-11-30

## 2023-11-24 RX ORDER — FOLIC ACID 1 MG/1
1 TABLET ORAL DAILY
Qty: 30 TABLET | Refills: 0 | Status: ON HOLD | DISCHARGE
Start: 2023-11-25

## 2023-11-24 RX ORDER — SENNA AND DOCUSATE SODIUM 50; 8.6 MG/1; MG/1
2 TABLET, FILM COATED ORAL NIGHTLY
Status: ON HOLD | DISCHARGE
Start: 2023-11-24

## 2023-11-24 RX ORDER — GAUZE BANDAGE 2" X 2"
100 BANDAGE TOPICAL DAILY
Status: DISCONTINUED | OUTPATIENT
Start: 2023-11-25 | End: 2023-12-07 | Stop reason: HOSPADM

## 2023-11-24 RX ORDER — FUROSEMIDE 20 MG/1
20 TABLET ORAL DAILY
Status: DISCONTINUED | OUTPATIENT
Start: 2023-11-25 | End: 2023-12-07 | Stop reason: HOSPADM

## 2023-11-24 RX ORDER — FOLIC ACID 1 MG/1
1 TABLET ORAL DAILY
Status: DISCONTINUED | OUTPATIENT
Start: 2023-11-25 | End: 2023-12-07 | Stop reason: HOSPADM

## 2023-11-24 RX ORDER — ASPIRIN 81 MG/1
81 TABLET ORAL DAILY
Status: DISCONTINUED | OUTPATIENT
Start: 2023-11-25 | End: 2023-12-07 | Stop reason: HOSPADM

## 2023-11-24 RX ORDER — THIAMINE MONONITRATE (VIT B1) 100 MG
100 TABLET ORAL DAILY
Refills: 0 | Status: ON HOLD | DISCHARGE
Start: 2023-11-25

## 2023-11-24 RX ORDER — POLYETHYLENE GLYCOL 3350 17 G/17G
17 POWDER, FOR SOLUTION ORAL DAILY PRN
Status: DISCONTINUED | OUTPATIENT
Start: 2023-11-24 | End: 2023-12-07 | Stop reason: HOSPADM

## 2023-11-24 RX ORDER — FUROSEMIDE 20 MG/1
20 TABLET ORAL DAILY
Qty: 60 TABLET | Refills: 0 | Status: ON HOLD | DISCHARGE
Start: 2023-11-25

## 2023-11-24 RX ORDER — CEFUROXIME AXETIL 250 MG/1
500 TABLET ORAL EVERY 24 HOURS
Status: COMPLETED | OUTPATIENT
Start: 2023-11-24 | End: 2023-11-25

## 2023-11-24 RX ORDER — CEFUROXIME AXETIL 500 MG/1
500 TABLET ORAL 2 TIMES DAILY
Qty: 3 TABLET | Refills: 0 | Status: ON HOLD | DISCHARGE
Start: 2023-11-24 | End: 2023-11-26

## 2023-11-24 RX ORDER — SPIRONOLACTONE 25 MG/1
25 TABLET ORAL DAILY
Status: DISCONTINUED | OUTPATIENT
Start: 2023-11-25 | End: 2023-12-07 | Stop reason: HOSPADM

## 2023-11-24 RX ORDER — MULTIVITAMIN WITH IRON
1 TABLET ORAL DAILY
Refills: 0 | Status: ON HOLD | DISCHARGE
Start: 2023-11-25

## 2023-11-24 RX ADMIN — FOLIC ACID 1 MG: 1 TABLET ORAL at 09:10

## 2023-11-24 RX ADMIN — SPIRONOLACTONE 25 MG: 25 TABLET ORAL at 09:10

## 2023-11-24 RX ADMIN — ASPIRIN 81 MG: 81 TABLET, COATED ORAL at 09:10

## 2023-11-24 RX ADMIN — SODIUM CHLORIDE, PRESERVATIVE FREE 10 ML: 5 INJECTION INTRAVENOUS at 09:16

## 2023-11-24 RX ADMIN — POLYETHYLENE GLYCOL 3350 17 G: 17 POWDER, FOR SOLUTION ORAL at 09:10

## 2023-11-24 RX ADMIN — THERA TABS 1 TABLET: TAB at 09:10

## 2023-11-24 RX ADMIN — Medication 100 MG: at 09:10

## 2023-11-24 RX ADMIN — CEFEPIME 1000 MG: 1 INJECTION, POWDER, FOR SOLUTION INTRAMUSCULAR; INTRAVENOUS at 09:03

## 2023-11-24 RX ADMIN — CEFUROXIME AXETIL 500 MG: 250 TABLET, FILM COATED ORAL at 20:36

## 2023-11-24 RX ADMIN — FUROSEMIDE 20 MG: 20 TABLET ORAL at 09:10

## 2023-11-24 RX ADMIN — APIXABAN 10 MG: 5 TABLET, FILM COATED ORAL at 20:35

## 2023-11-24 RX ADMIN — APIXABAN 10 MG: 5 TABLET, FILM COATED ORAL at 09:09

## 2023-11-24 RX ADMIN — ATENOLOL 50 MG: 25 TABLET ORAL at 09:10

## 2023-11-24 ASSESSMENT — PAIN SCALES - GENERAL
PAINLEVEL_OUTOF10: 0

## 2023-11-24 NOTE — CARE COORDINATION
Per CM team, patient expressed her preference in ARU at dc. CM requested attending to place dc order and summary. CM met with patient, ARU manager Jazmyn Robb, patient daughter to reiterate the hospital has an obligation to respect patient request. Patient request to receive additional therapy at ARU. IMM letter completed and placed in chart.      CHRISTIE Crabtree  Care Management

## 2023-11-24 NOTE — H&P
[id number] [address] [place]  [address] [facility], [address] [facility]     INPATIENT REHABILITATION  HISTORY AND PHYSICAL      Name: Bull Parekh CSN: [id number]   Age: 80 y.o. MRN: [id number]   Sex: female Admit Date: 11/24/2023     PCP: Lokesh Mckee MD         Subjective:     Patient seen and examined. History of the Present Illness:   80 y.o.  female with a past medical history of HTN, urinary retention with chronic sanchez s/p intermittent straight cath,  recurrent UTI's, afib not on anticoagulation who presented to [facility] on 11/20/23  with fever and hypoxia. Patient fell on 11/6/2023 and had a right hip fracture which was repaired on 11/7/23 at [address] [place] [address]. The patient's course at that time was complicated by SALVATORE and hypotension. The patient was then discharged to SNF after that hospitalization and has been doing poorly since. Apparently at the SNF the patient felt rigors and chills along with associated shortness of breath. The patient was noted to have a fever of 100.8 at her SNF prior to her ED transfer. Patient was brought to [facility] ED for further evaL. Patient was found to have low oxygen saturation on admission to the ED in the 80's. A CTA was done showing pulmonary emboli in the left lung and ground glass opacities suggestive of pneumonia/pulmonary edema. Patient was initiated on broad-spectrum antibiotics, and heparin gtt. Admitted to hospitalist service. Patient improved with antibiotics, and bronchodilators. Initially patient was started on IV heparin drip, which was later transitioned to NOAC at discharge. Pulmonary was consulted who agreed with NOAC on discharge plan. Patient also has a known history of chronic urinary retention and currently on Sanchez catheter. Patient also noted to have UTI, ID was seen recommended p.o. antibiotics at discharge. The patient  was noted to have impaired mobility and ADLs.  Patient was felt to be a good candidate for dysfunction, bowel/bladder management, safety, and communication. Estimated length of stay for this admission 14 days    Anticipated disposition: Home. The potential to achieve that is very good. Discussed with the patient at the bedside and explained about my above plan care. Answered all her questions and concerns at the bedside appropriate. PRECAUTIONS:   1. Safety/fall precautions. 2. Deep venous thrombosis precautions. 3. Aspiration precautions. POTENTIAL BARRIERS TO DISCHARGE:   1. Risk for falls. 2. Current home layout. 3. Family limited in ability to provide constant care. 4. Limited community resources. RELEVANT CHANGES SINCE PREADMISSION SCREENING: I have compared the patient's medical and functional status at the time of the pre-admission screening and on this post-admission evaluation. The preadmission screen and findings from therapy evaluations both support my post admission physician evaluation, deeming this patient to be an appropriate candidate for the IRF. The patient requires multidisciplinary treatment, physician oversight and intensive therapy not provided at a lower level of care. By signing this document, I acknowledge that I have personally performed a full physical examination on this patient within 24 hours of admission to this inpatient rehabilitation facility and have determined the patient to be able to tolerate the above course of treatment at an intensive level for a reasonable period of time. I will be completing a detailed individualized plan of care for this patient by day #4 of the patient's stay based upon the Pre-Admission Screen, the History and Physical Evaluation, and the therapy evaluations. Total clinical care time is 75 minutes, including review of chart including all labs, radiology, past medical history, and discussion with patient. Greater than 50% of my time was spent in coordination of care and counseling.     Dragon medical dictation software was used for portions of this report. Unintended errors may occur.       Signed:    Jurgen Arias MD    November 24, 2023

## 2023-11-24 NOTE — PROGRESS NOTES
ARU/IPR REFERRAL CONTACT NOTE  [address] [place] [address] for Physical Rehabilitation      Thank you for the opportunity to review this patient's case for admission to [address] [place] [address] Physical Rehabilitation. Based on our pre-admission screening:     Addendum: 11:17am - Pt agreeable to admit to ARU. Plan to admit to room 180 as soon as dc order and summary are secured    Addendum 10:55am - Updated by 4N CCL that patient prefers to go home with [facility]. MD and CM aware. Will sign off at this time. [ x] This patient meets criteria for admission to [facility]. Again, Thank you for this referral. Should you have any questions please do not hesitate to call.      Sincerely,  Lola Favre Liaison  [facility]  [phone]

## 2023-11-24 NOTE — PROGRESS NOTES
Pharmacy Note - Renal dose adjustment made per P/T protocol    Original order:  Ceftin 500 mg PO BID    Estimated Creatinine Clearance: 20 mL/min (based on SCr of 1.24 mg/dL). Recent Labs     11/22/23  0455 11/23/23  0414 11/24/23  0118   BUN 28* 28* 32*   CREATININE 0.99 0.97 1.24       Renally adjusted order:  Ceftin 500 mg PO q 24 h    Please call pharmacy with any questions.     Thank you,  Jeannine Padron [facility] MS  11/24/2023 2:40 PM

## 2023-11-24 NOTE — PLAN OF CARE
Problem: Physical Therapy - Adult  Goal: By Discharge: Performs mobility at highest level of function for planned discharge setting. See evaluation for individualized goals. Description: Physical Therapy Short Term Goals  Initiated 11/24/2023 and to be accomplished within 7 day(s) (12/1/2023)  1. Patient will supine to sit, sit to supine, roll right, and roll left  in bed with supervision/set-up without use of bed rails for support. 2.  Patient will transfer from bed to chair and chair to bed with CGA using the least restrictive device. 3.  Patient will perform sit to stand with contact guard assist  4. Patient will ambulate with minimal assistance/contact guard assist for 50 feet with the least restrictive device. 5.  Patient will ascend/descend 4 stairs with B handrail(s) with minimal assistance/contact guard assist.    Physical Therapy Long Term Goals  Initiated 11/24/2023 and to be accomplished within 14 day(s) (12/8/2023)  1. Patient will supine to sit, sit to supine, roll right, and roll left in bed with modified independence. 2.  Patient will transfer from bed to chair and chair to bed with supervision/set-up using the least restrictive device. 3.  Patient will perform sit to stand with supervision/set-up. 4.  Patient will ambulate with supervision/set-up for 150 feet with the least restrictive device. 5.  Patient will ascend/descend 4 stairs with B handrail(s) with supervision/set-up    Outcome: Progressing         PHYSICAL THERAPY EVALUATION    Patient: Paulina Valdes (12 y.o. female)  Date: 11/24/2023  Diagnosis: Impaired mobility and ADLs [Z74.09, Z78.9]   Precautions: Fall Risk;Weight Bearing WBAT Right LE. Chart, physical therapy assessment, plan of care and goals were reviewed.     Time In: 1520  Time Out: [address]  Minutes: 45  Entered Differentiated Treatment minutes: Yes    Patient seen for: Patient Education, Initial Evaluation, Transfer Training, Gait Training, Wheelchair Mobility presents with increased right LE pain with decreased activity tolerance, decreased functional strength and balance and postural dysfunction limiting safety and independence with mobility.    Patient will benefit from skilled intervention to address the above impairments.  Patient's rehabilitation potential is considered to be Therapy Prognosis: Good  Factors which may influence rehabilitation potential include:   []         None noted  []         Mental ability/status  [x]         Medical condition  [x]         Home/family situation and support systems  []         Safety awareness  [x]         Pain tolerance/management  []         Other:      PLAN :  Please refer to POC for details re: long term goals    Order received from MD for physical therapy services and chart reviewed.  Pt to be seen 5 times per week for 3 hours of total therapy per day for 2 weeks.  Thank you for the referral.    Pt would benefit from skilled physical therapy in order to improve independent functional mobility within the home. Interventions may include range of motion (AROM, PROM B LE/trunk), motor function (B LE/trunk strengthening/coordination), activity tolerance (vitals, oxygen saturation levels), bed mobility training, balance activities, gait training (progressive ambulation program), wheelchair management and functional transfer training. Patient would also benefit from concurrent and group therapy sessions to promote increased participation in skilled therapy interventions and to provide opportunities for increased social interaction.     Discharge Recommendations:  Discharge Recommendations: Home with Home health PT;24 hour supervision or assist  Further Equipment Recommendations for Discharge:  Wheelchair: Standard (maikel-height)     Walker: Rolling          IRF-JOSEE Completed: Done       Activity Tolerance:   Fair - pt limited by c/o pain and fatigue.  Nursing staff notified re: pt's c/o 7/10 right LE pain.  Please refer to the

## 2023-11-24 NOTE — PROGRESS NOTES
Pt's daughter Molly Carr with the dog showed up in patent's room to visit. Called security to inform that the pt's daughter is within the unit. Security arrived and escorted the daughter outside the unit for pt's safety.
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
## 2023-11-24 NOTE — DISCHARGE SUMMARY
Discharge Summary    Patient: Campbell Maldonado               Sex: female          DOA: 11/20/2023         YOB: 1928      Age:  80 y.o.        LOS:  LOS: 4 days                Admit Date: 11/20/2023    Discharge Date: 11/24/2023    Admission Diagnoses: Sepsis ([address]) [A41.9]    Reason for Admission:    80 y.o.  female with a past medical history of HTN, urinary retention with chronic sanchez s/p intermittent straight cath,  recurrent UTI's, afib not on anticoagulation who presented to [facility] on 11/20/23  with fever and hypoxia. Patient fell on 11/6/2023 and had a right hip fracture which was repaired on 11/7/23 at [address] [place] [address]. The patient's course at that time was complicated by SALVATORE and hypotension. The patient was then discharged to SNF after that hospitalization and has been doing poorly since. Apparently at the SNF the patient felt rigors and chills along with associated shortness of breath. The patient was noted to have a fever of 100.8 at her SNF prior to her ED transfer. Patient was brought to [facility] ED for further evaL. Patient was found to have low oxygen saturation on admission to the ED in the 80's. A CTA was done showing pulmonary emboli in the left lung and ground glass opacities suggestive of pneumonia/pulmonary edema. Patient was initiated on broad-spectrum antibiotics, and heparin gtt. Admitted to hospitalist service. Discharge Condition:  good     Hospital Course:  1. Sepsis POA (fever, leukocytosis), resolving, source most likely urine. 2. Small pleural effusions. ICS. 3. Pulmonary emboli, DVTs heparin gtt --> NOAC. Patient is tolerating. 4. Chronic urinary retention, now with Sanchez, did CIC in the past.  Recommend to leave Sanchez in place at this time given recent surgery, CIC will be uncomfortable for patient in the setting of recent hip surgery. 5. History of atrial fib, on atenolol and noac.   6.  Hypertension, well controlled on BB.   7.  Catheter 11/19/2023    Narrative  CTA CHEST    HISTORY: New hypoxia, concern for pneumonia or PE    COMPARISON: Chest x-ray 11/19/2023    TECHNIQUE:  Multidetector helical CT angiography was performed of the chest with axial images as well as coronal and sagittal images and reformatted MIP images provided following the intravenous administration of contrast.    INTRAVENOUS CONTRAST ADMINISTRATION: 100 mL Omnipaque 350    FINDINGS:    Cardiovascular: Multiple filling defects within segmental and subsegmental branches of the left lower lobe and left upper lobe pulmonary arteries compatible with embolism. Main pulmonary artery is nondilated. No overt deformity of the intraventricular septum. Mitral annulus calcifications. Moderate atherosclerosis of the coronary arteries and thoracic aorta. No overt thoracic aortic aneurysm or dissection. Dual-lead pacemaker with coronary leads. Heart is enlarged. Lungs: Patchy groundglass opacities throughout the lungs mostly in the lung apices. Interlobular septal thickening. 4 mm nodular opacity in the inferior right lower lobe. Pleura: Moderate bilateral pleural effusions. Airways: Central airways are patent. Mediastinum: Within normal limits    Lower Neck/Chest Wall: No overt axillary lymphadenopathy. Thyroid gland appears unremarkable. VISUALIZED ABDOMEN:  Within normal limits. MUSCULOSKELETAL:  Degenerative changes of the spine and shoulders. Impression  IMPRESSION:  1. Segmental and subsegmental left upper and lower lobe pulmonary emboli. 2.  Patchy groundglass opacities suggestive of pulmonary edema and less likely infectious-inflammatory process. 3.  Moderate bilateral pleural effusions. 4.  Tiny right lower lobe nodular opacity. Consider 12 month follow-up chest CT if patient has risk factors such as smoking history. 5.  Cardiomegaly with coronary artery calcifications. Reading Doctor: Emily Justice  Electronic Signature by:  Robyn Calderón

## 2023-11-25 LAB
ANION GAP SERPL CALC-SCNC: 2 MMOL/L (ref 3–18)
BASOPHILS # BLD: 0 K/UL (ref 0–0.1)
BASOPHILS NFR BLD: 0 % (ref 0–2)
BUN SERPL-MCNC: 30 MG/DL (ref 7–18)
BUN/CREAT SERPL: 27 (ref 12–20)
CALCIUM SERPL-MCNC: 8 MG/DL (ref 8.5–10.1)
CHLORIDE SERPL-SCNC: 101 MMOL/L (ref 100–111)
CO2 SERPL-SCNC: 30 MMOL/L (ref 21–32)
CREAT SERPL-MCNC: 1.12 MG/DL (ref 0.6–1.3)
DIFFERENTIAL METHOD BLD: ABNORMAL
EOSINOPHIL # BLD: 0.2 K/UL (ref 0–0.4)
EOSINOPHIL NFR BLD: 4 % (ref 0–5)
ERYTHROCYTE [DISTWIDTH] IN BLOOD BY AUTOMATED COUNT: 17.2 % (ref 11.6–14.5)
GLUCOSE SERPL-MCNC: 96 MG/DL (ref 74–99)
HCT VFR BLD AUTO: 25.5 % (ref 35–45)
HGB BLD-MCNC: 8.6 G/DL (ref 12–16)
IMM GRANULOCYTES # BLD AUTO: 0 K/UL (ref 0–0.04)
IMM GRANULOCYTES NFR BLD AUTO: 0 % (ref 0–0.5)
LYMPHOCYTES # BLD: 0.9 K/UL (ref 0.9–3.6)
LYMPHOCYTES NFR BLD: 17 % (ref 21–52)
MCH RBC QN AUTO: 35.5 PG (ref 24–34)
MCHC RBC AUTO-ENTMCNC: 33.7 G/DL (ref 31–37)
MCV RBC AUTO: 105.4 FL (ref 78–100)
MONOCYTES # BLD: 0.6 K/UL (ref 0.05–1.2)
MONOCYTES NFR BLD: 11 % (ref 3–10)
NEUTS SEG # BLD: 3.5 K/UL (ref 1.8–8)
NEUTS SEG NFR BLD: 67 % (ref 40–73)
NRBC # BLD: 0.02 K/UL (ref 0–0.01)
NRBC BLD-RTO: 0.4 PER 100 WBC
PLATELET # BLD AUTO: 198 K/UL (ref 135–420)
PMV BLD AUTO: 10.2 FL (ref 9.2–11.8)
POTASSIUM SERPL-SCNC: 4.1 MMOL/L (ref 3.5–5.5)
RBC # BLD AUTO: 2.42 M/UL (ref 4.2–5.3)
SODIUM SERPL-SCNC: 133 MMOL/L (ref 136–145)
WBC # BLD AUTO: 5.2 K/UL (ref 4.6–13.2)

## 2023-11-25 PROCEDURE — 80048 BASIC METABOLIC PNL TOTAL CA: CPT

## 2023-11-25 PROCEDURE — 97110 THERAPEUTIC EXERCISES: CPT

## 2023-11-25 PROCEDURE — 36415 COLL VENOUS BLD VENIPUNCTURE: CPT

## 2023-11-25 PROCEDURE — 97116 GAIT TRAINING THERAPY: CPT

## 2023-11-25 PROCEDURE — 6370000000 HC RX 637 (ALT 250 FOR IP): Performed by: HOSPITALIST

## 2023-11-25 PROCEDURE — 1180000000 HC REHAB R&B

## 2023-11-25 PROCEDURE — 85025 COMPLETE CBC W/AUTO DIFF WBC: CPT

## 2023-11-25 RX ADMIN — FUROSEMIDE 20 MG: 20 TABLET ORAL at 09:23

## 2023-11-25 RX ADMIN — APIXABAN 10 MG: 5 TABLET, FILM COATED ORAL at 09:23

## 2023-11-25 RX ADMIN — Medication 100 MG: at 09:24

## 2023-11-25 RX ADMIN — THERA TABS 1 TABLET: TAB at 09:24

## 2023-11-25 RX ADMIN — ATENOLOL 50 MG: 50 TABLET ORAL at 06:33

## 2023-11-25 RX ADMIN — FOLIC ACID 1 MG: 1 TABLET ORAL at 09:23

## 2023-11-25 RX ADMIN — CEFUROXIME AXETIL 500 MG: 250 TABLET, FILM COATED ORAL at 20:07

## 2023-11-25 RX ADMIN — ASPIRIN 81 MG: 81 TABLET, COATED ORAL at 09:24

## 2023-11-25 RX ADMIN — APIXABAN 10 MG: 5 TABLET, FILM COATED ORAL at 20:07

## 2023-11-25 RX ADMIN — SPIRONOLACTONE 25 MG: 25 TABLET ORAL at 09:24

## 2023-11-25 ASSESSMENT — PAIN SCALES - GENERAL
PAINLEVEL_OUTOF10: 0

## 2023-11-25 NOTE — PROGRESS NOTES
0800 Pt. Awake sitting up in bed alert and oriented x 4 no change in assessment. 0930 Pt. Sitting up in chair eating breakfast.   1200 with therapy. 1330 able to transfer from bed to chair with assist.   1500 no change in assessment. 1800 Pt. Sitting up in chair eating dinner.

## 2023-11-25 NOTE — PLAN OF CARE
Problem: Physical Therapy - Adult  Goal: By Discharge: Performs mobility at highest level of function for planned discharge setting. See evaluation for individualized goals. Description: Physical Therapy Short Term Goals  Initiated 11/24/2023 and to be accomplished within 7 day(s) (12/1/2023)  1. Patient will supine to sit, sit to supine, roll right, and roll left  in bed with supervision/set-up without use of bed rails for support. 2.  Patient will transfer from bed to chair and chair to bed with CGA using the least restrictive device. 3.  Patient will perform sit to stand with contact guard assist  4. Patient will ambulate with minimal assistance/contact guard assist for 50 feet with the least restrictive device. 5.  Patient will ascend/descend 4 stairs with B handrail(s) with minimal assistance/contact guard assist.    Physical Therapy Long Term Goals  Initiated 11/24/2023 and to be accomplished within 14 day(s) (12/8/2023)  1. Patient will supine to sit, sit to supine, roll right, and roll left in bed with modified independence. 2.  Patient will transfer from bed to chair and chair to bed with supervision/set-up using the least restrictive device. 3.  Patient will perform sit to stand with supervision/set-up. 4.  Patient will ambulate with supervision/set-up for 150 feet with the least restrictive device. 5.  Patient will ascend/descend 4 stairs with B handrail(s) with supervision/set-up    Outcome: Progressing     PHYSICAL THERAPY TREATMENT    Patient: Shara Cortes (21 y.o. female)  Date: 11/25/2023  Diagnosis: Impaired mobility and ADLs [Z74.09, Z78.9]   Precautions: fall risk, WBAT R LE  Chart, physical therapy assessment, plan of care and goals were reviewed. Time in:  1215  Time out :  1245    Patient seen for: gait and transfer training, strengthening    Pain:  Pt pain was reported as 0 pre-treatment. Pt pain was reported as 0 post-treatment.       Patient identified with name and : Yes    SUBJECTIVE:      \"I slept pretty good last night. \"    OBJECTIVE DATA SUMMARY:    Objective:   Education:    BED/MAT MOBILITY Daily Assessment    Rolling Right      Rolling Left      Supine to Sit Minimal assistance (Based on OT report)    Sit to Supine Minimal assistance (based on OT report)      TRANSFERS Daily Assessment    Sit to Stand Minimal Assistance    Transfer Assist Score Minimal assistance  Minimal Assistance          Comments Verbal cueing for hand placement to push up from chair and eccentric control     Car Transfer  N/a    Car Type N/a        GAIT Daily Assessment    Gait Deviations Decreased step height;Decreased step length    Assistive device Rolling Walker    Ambulation assistance - surface  Minimal assistance  Level tile    Distance  10 feet    Comments Verbal cueing provided for improved posture, increased GABY and improved foot clearance; patient unable to improve gait pattern even with vc from therapist    Ambulation-uneven surface      N/a          BALANCE Daily Assessment    Posture Fair    Sitting - Static Good    Sitting - Dynamic Fair;+    Standing - Static Poor    Standing - Dynamic Poor    Comments          THERAPEUTIC EXERCISES Daily Assessment     Seated BLE there ex: heel raises, toe raises, hip abd, marching and LAQ 2x15 each. ASSESSMENT:  Increased difficulty with exs on R LE however able to complete all with encouragement. Patient required frequent rest breaks due to decreased endurance and activity tolerance. Patient able to ambulate a little farther however required minimal assistance with VC for sequencing and safety awareness. Progression toward goals:  [x]      Improving appropriately and progressing toward goals  []      Improving slowly and progressing toward goals  []      Not making progress toward goals and plan of care will be adjusted      PLAN:  Patient continues to benefit from skilled intervention to address the above impairments.   Continue treatment per established plan of care.  Discharge Recommendations:  Home with Home health PT;24 hour supervision or assist  Further Equipment Recommendations for Discharge:  Standard (maikel-height)     Rolling             Estimated Discharge Date:12/8/23    Activity Tolerance:   fair  Please refer to the flowsheet for vital signs taken during this treatment.    After treatment:   [] Patient left in no apparent distress in bed  [x] Patient left in no apparent distress sitting up in chair  [] Patient left in no apparent distress in w/c mobilizing under own power  [] Patient left in no apparent distress dining area  [] Patient left in no apparent distress mobilizing under own power  [x] Call bell left within reach  [x] Nursing notified  [] Caregiver present  [] Bed alarm activated   [x] Chair alarm activated        Kendra Park, PTA  11/25/2023

## 2023-11-26 LAB
BACTERIA SPEC CULT: NORMAL
BACTERIA SPEC CULT: NORMAL
SERVICE CMNT-IMP: NORMAL
SERVICE CMNT-IMP: NORMAL

## 2023-11-26 PROCEDURE — 97116 GAIT TRAINING THERAPY: CPT

## 2023-11-26 PROCEDURE — 51702 INSERT TEMP BLADDER CATH: CPT

## 2023-11-26 PROCEDURE — 6370000000 HC RX 637 (ALT 250 FOR IP): Performed by: FAMILY MEDICINE

## 2023-11-26 PROCEDURE — 97542 WHEELCHAIR MNGMENT TRAINING: CPT

## 2023-11-26 PROCEDURE — 1180000000 HC REHAB R&B

## 2023-11-26 PROCEDURE — 97110 THERAPEUTIC EXERCISES: CPT

## 2023-11-26 PROCEDURE — 6370000000 HC RX 637 (ALT 250 FOR IP): Performed by: HOSPITALIST

## 2023-11-26 RX ADMIN — ACETAMINOPHEN 325MG 650 MG: 325 TABLET ORAL at 20:07

## 2023-11-26 RX ADMIN — Medication 100 MG: at 08:51

## 2023-11-26 RX ADMIN — MUPIROCIN: 20 OINTMENT TOPICAL at 20:10

## 2023-11-26 RX ADMIN — ASPIRIN 81 MG: 81 TABLET, COATED ORAL at 08:50

## 2023-11-26 RX ADMIN — FOLIC ACID 1 MG: 1 TABLET ORAL at 08:51

## 2023-11-26 RX ADMIN — FUROSEMIDE 20 MG: 20 TABLET ORAL at 08:50

## 2023-11-26 RX ADMIN — APIXABAN 10 MG: 5 TABLET, FILM COATED ORAL at 20:06

## 2023-11-26 RX ADMIN — ATENOLOL 50 MG: 50 TABLET ORAL at 06:21

## 2023-11-26 RX ADMIN — THERA TABS 1 TABLET: TAB at 08:50

## 2023-11-26 RX ADMIN — APIXABAN 10 MG: 5 TABLET, FILM COATED ORAL at 08:50

## 2023-11-26 RX ADMIN — SPIRONOLACTONE 25 MG: 25 TABLET ORAL at 08:51

## 2023-11-26 ASSESSMENT — PAIN DESCRIPTION - DESCRIPTORS: DESCRIPTORS: ACHING

## 2023-11-26 ASSESSMENT — PAIN SCALES - WONG BAKER
WONGBAKER_NUMERICALRESPONSE: 0

## 2023-11-26 ASSESSMENT — PAIN SCALES - GENERAL
PAINLEVEL_OUTOF10: 0
PAINLEVEL_OUTOF10: 4
PAINLEVEL_OUTOF10: 0

## 2023-11-26 ASSESSMENT — PAIN DESCRIPTION - LOCATION: LOCATION: HIP

## 2023-11-26 ASSESSMENT — PAIN DESCRIPTION - ORIENTATION: ORIENTATION: RIGHT

## 2023-11-26 NOTE — PLAN OF CARE
Problem: Physical Therapy - Adult  Goal: By Discharge: Performs mobility at highest level of function for planned discharge setting. See evaluation for individualized goals. Description: Physical Therapy Short Term Goals  Initiated 11/24/2023 and to be accomplished within 7 day(s) (12/1/2023)  1. Patient will supine to sit, sit to supine, roll right, and roll left  in bed with supervision/set-up without use of bed rails for support. 2.  Patient will transfer from bed to chair and chair to bed with CGA using the least restrictive device. 3.  Patient will perform sit to stand with contact guard assist  4. Patient will ambulate with minimal assistance/contact guard assist for 50 feet with the least restrictive device. 5.  Patient will ascend/descend 4 stairs with B handrail(s) with minimal assistance/contact guard assist.    Physical Therapy Long Term Goals  Initiated 11/24/2023 and to be accomplished within 14 day(s) (12/8/2023)  1. Patient will supine to sit, sit to supine, roll right, and roll left in bed with modified independence. 2.  Patient will transfer from bed to chair and chair to bed with supervision/set-up using the least restrictive device. 3.  Patient will perform sit to stand with supervision/set-up. 4.  Patient will ambulate with supervision/set-up for 150 feet with the least restrictive device. 5.  Patient will ascend/descend 4 stairs with B handrail(s) with supervision/set-up    Outcome: Progressing     PHYSICAL THERAPY TREATMENT    Patient: Rosa Paul (15 y.o. female)  Date: 11/26/2023  Diagnosis: Impaired mobility and ADLs [Z74.09, Z78.9]   Precautions: falls  Chart, physical therapy assessment, plan of care and goals were reviewed. Time in: 1130  Time out: 1230    Time in: 1430  Time out : 1530     Patient seen for: therapeutic exercises, w/c and gait training    Pain:  Pt pain was reported as 0/10 pre-treatment.   Pt pain was reported as 0/10

## 2023-11-27 PROCEDURE — 99232 SBSQ HOSP IP/OBS MODERATE 35: CPT | Performed by: HOSPITALIST

## 2023-11-27 PROCEDURE — 97542 WHEELCHAIR MNGMENT TRAINING: CPT

## 2023-11-27 PROCEDURE — 97110 THERAPEUTIC EXERCISES: CPT

## 2023-11-27 PROCEDURE — 6370000000 HC RX 637 (ALT 250 FOR IP): Performed by: HOSPITALIST

## 2023-11-27 PROCEDURE — 1180000000 HC REHAB R&B

## 2023-11-27 PROCEDURE — 97530 THERAPEUTIC ACTIVITIES: CPT

## 2023-11-27 PROCEDURE — 97116 GAIT TRAINING THERAPY: CPT

## 2023-11-27 PROCEDURE — 97112 NEUROMUSCULAR REEDUCATION: CPT

## 2023-11-27 RX ADMIN — THERA TABS 1 TABLET: TAB at 08:20

## 2023-11-27 RX ADMIN — APIXABAN 10 MG: 5 TABLET, FILM COATED ORAL at 08:20

## 2023-11-27 RX ADMIN — ACETAMINOPHEN 325MG 650 MG: 325 TABLET ORAL at 10:26

## 2023-11-27 RX ADMIN — MUPIROCIN: 20 OINTMENT TOPICAL at 08:23

## 2023-11-27 RX ADMIN — APIXABAN 10 MG: 5 TABLET, FILM COATED ORAL at 20:06

## 2023-11-27 RX ADMIN — MUPIROCIN: 20 OINTMENT TOPICAL at 20:08

## 2023-11-27 RX ADMIN — SPIRONOLACTONE 25 MG: 25 TABLET ORAL at 08:19

## 2023-11-27 RX ADMIN — ASPIRIN 81 MG: 81 TABLET, COATED ORAL at 08:20

## 2023-11-27 RX ADMIN — FUROSEMIDE 20 MG: 20 TABLET ORAL at 08:20

## 2023-11-27 RX ADMIN — Medication 100 MG: at 08:19

## 2023-11-27 RX ADMIN — FOLIC ACID 1 MG: 1 TABLET ORAL at 08:19

## 2023-11-27 ASSESSMENT — PAIN SCALES - GENERAL
PAINLEVEL_OUTOF10: 0
PAINLEVEL_OUTOF10: 3
PAINLEVEL_OUTOF10: 0
PAINLEVEL_OUTOF10: 1
PAINLEVEL_OUTOF10: 0
PAINLEVEL_OUTOF10: 0
PAINLEVEL_OUTOF10: 6
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
## 2023-11-27 NOTE — PLAN OF CARE
Problem: Safety - Adult  Goal: Free from fall injury  11/26/2023 2229 by Briseyda Osborn RN  Outcome: Progressing  11/26/2023 2229 by Briseyda Osborn RN  Outcome: Progressing  Flowsheets (Taken 11/26/2023 2227)  Free From Fall Injury: Instruct family/caregiver on patient safety     Problem: Skin/Tissue Integrity  Goal: Absence of new skin breakdown  Description: 1. Monitor for areas of redness and/or skin breakdown  2. Assess vascular access sites hourly  3. Every 4-6 hours minimum:  Change oxygen saturation probe site  4. Every 4-6 hours:  If on nasal continuous positive airway pressure, respiratory therapy assess nares and determine need for appliance change or resting period.   11/26/2023 2229 by Briseyda Osborn RN  Outcome: Progressing  11/26/2023 2229 by Briseyda Osborn RN  Outcome: Progressing     Problem: ABCDS Injury Assessment  Goal: Absence of physical injury  Outcome: Progressing  Flowsheets (Taken 11/26/2023 2227)  Absence of Physical Injury: Implement safety measures based on patient assessment     Problem: Pain  Goal: Verbalizes/displays adequate comfort level or baseline comfort level  Outcome: Progressing

## 2023-11-27 NOTE — PROGRESS NOTES
conducted an initial consultation and Spiritual Assessment for Milton Asher, who is a 80 y. o.,female. Patient's Primary Language is: [place]. According to the patient's EMR Hinduism Affiliation is: None. The reason the Patient came to the hospital is:   Patient Active Problem List    Diagnosis Date Noted    Impaired mobility and ADLs 11/24/2023    Hypokalemia 11/22/2023    Severe protein-calorie malnutrition ([address]) 11/21/2023    Sepsis ([address]) 11/20/2023    Pulmonary embolus ([address]) 11/20/2023    Atrial fibrillation with controlled ventricular response ([address])     Urinary retention     Syncope and collapse 03/11/2017    Essential hypertension, benign 11/04/2014    History of frequent urinary tract infections 08/23/2013    Incomplete bladder emptying 10/19/2012    Microhematuria 10/19/2012    Urinary tract infection associated with indwelling urethral catheter ([address]) 10/19/2012        The  provided the following Interventions:  Initiated a relationship of care and support. Explored issues of michelle, belief, spirituality and Synagogue/ritual needs while hospitalized. Listened empathically. Provided information about Spiritual Care Services. Offered prayer and assurance of continued prayers on patient's behalf. Chart reviewed. The following outcomes where achieved:  Patient shared limited information about both their medical narrative and spiritual journey/beliefs.  confirmed Patient's Hinduism Affiliation. Patient processed feeling about current hospitalization. Patient expressed gratitude for 's visit. Assessment:  Patient does not have any Synagogue/cultural needs that will affect patient's preferences in health care. There are no spiritual or Synagogue issues which require intervention at this time. Plan:  Chaplains will continue to follow and will provide pastoral care on an as needed/requested basis.    recommends bedside caregivers page  on duty if patient shows signs of acute spiritual or emotional distress.     [facility]   [facility] [id number] [facility]  [phone]

## 2023-11-27 NOTE — PLAN OF CARE
Problem: Physical Therapy - Adult  Goal: By Discharge: Performs mobility at highest level of function for planned discharge setting. See evaluation for individualized goals. Description: Physical Therapy Short Term Goals  Initiated 11/24/2023 and to be accomplished within 7 day(s) (12/1/2023)  1. Patient will supine to sit, sit to supine, roll right, and roll left  in bed with supervision/set-up without use of bed rails for support. 2.  Patient will transfer from bed to chair and chair to bed with CGA using the least restrictive device. 3.  Patient will perform sit to stand with contact guard assist  4. Patient will ambulate with minimal assistance/contact guard assist for 50 feet with the least restrictive device. 5.  Patient will ascend/descend 4 stairs with B handrail(s) with minimal assistance/contact guard assist.    Physical Therapy Long Term Goals  Initiated 11/24/2023 and to be accomplished within 14 day(s) (12/8/2023)  1. Patient will supine to sit, sit to supine, roll right, and roll left in bed with modified independence. 2.  Patient will transfer from bed to chair and chair to bed with supervision/set-up using the least restrictive device. 3.  Patient will perform sit to stand with supervision/set-up. 4.  Patient will ambulate with supervision/set-up for 150 feet with the least restrictive device. 5.  Patient will ascend/descend 4 stairs with B handrail(s) with supervision/set-up    Outcome: Progressing     PHYSICAL THERAPY TREATMENT    Patient: Trang Aceves (2 y.o. female)  Date: 11/27/2023  Diagnosis: Impaired mobility and ADLs [Z74.09, Z78.9]   Precautions: Fall precautions, WBAT RLE  Chart, physical therapy assessment, plan of care and goals were reviewed. Time in: 1000  Time out : 1130  Time in: 1330  Time out : 1400    Patient seen for:  There ex, There act, gait training, transfer training, balance training, sequencing training    Pain:  Pt pain was reported as 0/10 intervention to address the above impairments. Continue treatment per established plan of care. Discharge Recommendations:  home with 24hr  supervision with home health therapy services   Further Equipment Recommendations for Discharge:  FWW                    Estimated Discharge Date: 12/8/23    Activity Tolerance:   Fair  Please refer to the flowsheet for vital signs taken during this treatment.     After treatment:   [] Patient left in no apparent distress in bed  [x] Patient left in no apparent distress sitting up in chair  [] Patient left in no apparent distress in w/c mobilizing under own power  [] Patient left in no apparent distress dining area  [] Patient left in no apparent distress mobilizing under own power  [x] Call bell left within reach  [] Nursing notified  [] Caregiver present  [] Bed alarm activated   [x] Chair alarm activated        Sanya Ovalle, PT  11/27/2023

## 2023-11-27 NOTE — PROGRESS NOTES
Request received from patient's son-in-law to have his wife (patient's daughter) Monetradha Haskins updated on the patient's condition. I met bedside with patient and patient approved for this writer to call the patient to give her an update. The patient did not wish to be a part of the call but ask that I follow up with her when the call was completed.

## 2023-11-27 NOTE — PLAN OF CARE
[address] [facility] PHYSICAL REHABILITATION  [address], [facility], [address]  OVERALL PLAN OF CARE    Name: Goldy De La Cruz CSN: [id number]   Age: 80 y.o. MRN: [id number]   Sex: female Admit Date: 11/24/2023     Primary Rehabilitation Diagnosis: Impaired Mobility and ADLs secondary to sepsis, acute pulmonary embolism, acute DVT, and acute deconditioning. ANTICIPATED INTERVENTIONS THAT SUPPORT THE MEDICAL NECESSITY OF THIS ADMISSION:    I. Physical Therapy              A. Intensity: 3 hour per day              B. Frequency: 5 times per week              C. Duration: 2 weeks              D. Long Term Goals:  1. Patient will supine to sit, sit to supine, roll right, and roll left in bed with modified independence. 2.  Patient will transfer from bed to chair and chair to bed with supervision/set-up using the least restrictive device. 3.  Patient will perform sit to stand with supervision/set-up. 4.  Patient will ambulate with supervision/set-up for 150 feet with the least restrictive device. 5.  Patient will ascend/descend 4 stairs with B handrail(s) with supervision/set-up       E. Interventions:   Pt would benefit from skilled physical therapy in order to improve independent functional mobility within the home. Interventions may include range of motion (AROM, PROM B LE/trunk), motor function (B LE/trunk strengthening/coordination), activity tolerance (vitals, oxygen saturation levels), bed mobility training, balance activities, gait training (progressive ambulation program), wheelchair management and functional transfer training. Patient would also benefit from concurrent and group therapy sessions to promote increased participation in skilled therapy interventions and to provide opportunities for increased social interaction. II.  Occupational Therapy              A. Intensity: 3 hour per day              B. Frequency: 5 times per week              C. Duration: 2 weeks              D. Long Term Goals:  Initiated 11/24/2023 and to be accomplished within 2 week(s) 12/8/2023  1. Pt will perform self-feeding with Dinah. 2. Pt will perform grooming with Dinah. 3. Pt will perform UB bathing with Dinah  4. Pt will perform LB bathing with Dinah. 5. Pt will perform tub/shower transfer with Dinah. 6. Pt will perform UB dressing with Dinah. 7. Pt will perform LB dressing with Dinah. 8. Pt will perform toileting task with Dinah. 9. Pt will perform toilet transfer with Dinah. 10.  Pt will perform an IADL task with good safety and Dinah.        E. Interventions:   Pt would benefit from skilled occupational therapy in order to improve independent functional mobility/ADLs,/IADLs within the home. Interventions may include range of motion (AROM, PROM B UE), motor function (B UE/ strengthening/coordination), activity tolerance (vitals, oxygen saturation levels), balance training, ADL/IADL training and functional transfer training. PHYSICIAN'S ASSESSMENT OF FINDINGS:    Are the established goals sufficient for achieving the optimal level of function? [x]  Yes      []  No    What changes would you recommend to the goals as written? None      Are the interventions noted sufficient for achieving the optimal level of function? [x]  Yes      []  No    What changes would you recommend to the interventions noted? If therapy staff is unable to provide 3 hr of total therapy per day in 5 days due to medical issues or decreased patient tolerance, may modify treatment schedule to 15 hr/week.       Estimated length of stay: 2 weeks      Medical rehabilitation prognosis:    []  Excellent     [x]  Good     []  Fair     []  Guarded       Discharge Destination:     [x]  Home     []  [address]     []  [address]     []  [address] [facility]     []  [address]     []  Other:       Signed:    Mary Farris MD    November 27, 2023

## 2023-11-28 PROCEDURE — 97530 THERAPEUTIC ACTIVITIES: CPT

## 2023-11-28 PROCEDURE — 1180000000 HC REHAB R&B

## 2023-11-28 PROCEDURE — 97542 WHEELCHAIR MNGMENT TRAINING: CPT

## 2023-11-28 PROCEDURE — 97535 SELF CARE MNGMENT TRAINING: CPT

## 2023-11-28 PROCEDURE — 97112 NEUROMUSCULAR REEDUCATION: CPT

## 2023-11-28 PROCEDURE — 99232 SBSQ HOSP IP/OBS MODERATE 35: CPT | Performed by: HOSPITALIST

## 2023-11-28 PROCEDURE — 97150 GROUP THERAPEUTIC PROCEDURES: CPT

## 2023-11-28 PROCEDURE — 6370000000 HC RX 637 (ALT 250 FOR IP): Performed by: HOSPITALIST

## 2023-11-28 PROCEDURE — 97110 THERAPEUTIC EXERCISES: CPT

## 2023-11-28 PROCEDURE — 97116 GAIT TRAINING THERAPY: CPT

## 2023-11-28 RX ADMIN — THERA TABS 1 TABLET: TAB at 09:11

## 2023-11-28 RX ADMIN — APIXABAN 10 MG: 5 TABLET, FILM COATED ORAL at 21:40

## 2023-11-28 RX ADMIN — MUPIROCIN: 20 OINTMENT TOPICAL at 21:44

## 2023-11-28 RX ADMIN — MUPIROCIN: 20 OINTMENT TOPICAL at 09:17

## 2023-11-28 RX ADMIN — FOLIC ACID 1 MG: 1 TABLET ORAL at 09:11

## 2023-11-28 RX ADMIN — ATENOLOL 50 MG: 50 TABLET ORAL at 06:26

## 2023-11-28 RX ADMIN — ASPIRIN 81 MG: 81 TABLET, COATED ORAL at 09:11

## 2023-11-28 RX ADMIN — SPIRONOLACTONE 25 MG: 25 TABLET ORAL at 09:11

## 2023-11-28 RX ADMIN — Medication 100 MG: at 09:11

## 2023-11-28 RX ADMIN — FUROSEMIDE 20 MG: 20 TABLET ORAL at 09:11

## 2023-11-28 RX ADMIN — APIXABAN 10 MG: 5 TABLET, FILM COATED ORAL at 09:11

## 2023-11-28 ASSESSMENT — PAIN SCALES - GENERAL
PAINLEVEL_OUTOF10: 0

## 2023-11-28 NOTE — PLAN OF CARE
Problem: Physical Therapy - Adult  Goal: By Discharge: Performs mobility at highest level of function for planned discharge setting. See evaluation for individualized goals. Description: Physical Therapy Short Term Goals  Initiated 11/24/2023 and to be accomplished within 7 day(s) (12/1/2023)  1. Patient will supine to sit, sit to supine, roll right, and roll left  in bed with supervision/set-up without use of bed rails for support. 2.  Patient will transfer from bed to chair and chair to bed with CGA using the least restrictive device. 3.  Patient will perform sit to stand with contact guard assist  4. Patient will ambulate with minimal assistance/contact guard assist for 50 feet with the least restrictive device. 5.  Patient will ascend/descend 4 stairs with B handrail(s) with minimal assistance/contact guard assist.    Physical Therapy Long Term Goals  Initiated 11/24/2023 and to be accomplished within 14 day(s) (12/8/2023)  1. Patient will supine to sit, sit to supine, roll right, and roll left in bed with modified independence. 2.  Patient will transfer from bed to chair and chair to bed with supervision/set-up using the least restrictive device. 3.  Patient will perform sit to stand with supervision/set-up. 4.  Patient will ambulate with supervision/set-up for 150 feet with the least restrictive device. 5.  Patient will ascend/descend 4 stairs with B handrail(s) with supervision/set-up    11/28/2023 1606 by Christina Srivastava, PT  Outcome: Progressing     PHYSICAL THERAPY TREATMENT    Patient: Ariana Maravilla (14 y.o. female)  Date: 11/28/2023  Diagnosis: Impaired mobility and ADLs [Z74.09, Z78.9]   Precautions: Fall precautions  Chart, physical therapy assessment, plan of care and goals were reviewed. Time in: 1000  Time out : 1100  Time in: 1330  Time out : 1400  Patient seen for:  There ex, There act, Gait Training, Transfer Training, Balance training    Pain:  Pt pain was reported as 0/10 pre-treatment. Pt pain was reported as 7/10 post-treatment. Intervention: Allowed patient to rest; Nursing informed    Patient identified with name and : Yes    SUBJECTIVE:      \"I'm going to [facility] when I leave this place. \"    OBJECTIVE DATA SUMMARY:    Objective:   Education: Education Given To: Patient  Education Provided: Transfer Training;ADL Function;Equipment; Safety  Education Method: Demonstration;Verbal;Teach Back  Education Outcome: Verbalized understanding;Demonstrated understanding;Continued education needed  BED/MAT MOBILITY Daily Assessment    Rolling Right Stand by assistance;Contact guard assistance    Rolling Left Contact guard assistance    Supine to Sit Minimal assistance (on mat via coming to long sitting 1st, then requiring assistance to advance RLE off the edge of mat, followed by the patient advancing LLE off the edge of mat)    Sit to Supine Minimal assistance (with assistance required to advance RLE onto mat)      TRANSFERS Daily Assessment    Sit to Stand Contact guard assistance (with verbal cues for hand/foot placement and sequencing.  The patient is noted to be unable to flex left greater than 90 degrees seconadary to increased pain/discomfort; therfore the patient pushes to standing and then slides RLE back until balanced.)    Transfer Assist Score Contact guard assistance (using stand-step technique without AD and verbal cues to reach for mat and step over, then reach back for mat and control descent)  Contact guard assistance          Comments The patient requires verbal cues for for correct hand/foot placement and sequencing       Car Transfer      Car Type         GAIT Daily Assessment    Gait Deviations Slow Shamika;Decreased step height;Decreased step length (noted trunk flexion with kyphotic posture, decreased and inconsistent step lengths with narrow GABY)    Assistive device Rolling Walker    Ambulation assistance - surface  Contact guard assistance  Level tile   Distance 15 feet and 30 feet    Comments     Ambulation-uneven surface              STEPS/STAIRS Daily Assessment     Steps/Stairs ambulated         Assistance Required      Rail Use      Comments      Curbs/Ramps             BALANCE Daily Assessment    Posture Fair    Sitting - Static Good    Sitting - Dynamic Good    Standing - Static Fair (with FWW)    Standing - Dynamic Fair;- (with FWW)    Comments        WHEELCHAIR MOBILITY/MANAGEMENT Daily Assessment   Able to Propel 155 feet in afternoon session   Assist Level Supervision   Curbs/ramps assistance required     Wheelchair management manages B brakes with extender   Comments The patient propels using BUEs and LLE     THERAPEUTIC EXERCISES Daily Assessment     Refer to grid below        EXERCISE   Sets   Reps   Active Active Assist   Passive Self ROM   Comments   Ankle Pumps 3 10  [x] [] [] [] On balance disk sitting   Quad Sets/Glut Sets   [] [] [] []    Hamstring Sets 3 10 [x] [] [] [] Sitting edge of mat with yellow TB   Short Arc Quads   [] [] [] []    Heel Slides   [] [] [] []    Straight Leg Raises 3 10 [x] [] [] [] AROM LLE, AAROM/PROM RLE   Hip Abd/Add 3 10 [x] [] [] [] Add with pillow in sitting, supine clam shells for abd   Long Arc Quads 3 10 [x] [] [] []    Seated Marching   [] [] [] []    Standing Marching   [] [] [] []    Bridging on Mat 3 10 [x] [] [] []       Neuro Re-Education:  Standing shoulder arch to facilitate the patient reaching across midline and outside standing GABY to improve weight shifting capacity, increase standing tolerance and illicit hip,knee and ankle strategies for balance    ASSESSMENT:  The patient tolerated treatment session well and is progressing towards STGs and LTGs  Progression toward goals:  [x]      Improving appropriately and progressing toward goals  []      Improving slowly and progressing toward goals  []      Not making progress toward goals and plan of care will be adjusted      PLAN:  Patient continues to  benefit from skilled intervention to address the above impairments. Continue treatment per established plan of care. Discharge Recommendations:  Home with Home health PT;24 hour supervision or assist vs SNF  Further Equipment Recommendations for Discharge:  Standard     Rolling             Estimated Discharge Date: 11/30/23    Activity Tolerance:   Fair  Please refer to the flowsheet for vital signs taken during this treatment.     After treatment:   [] Patient left in no apparent distress in bed  [x] Patient left in no apparent distress sitting up in chair  [] Patient left in no apparent distress in w/c mobilizing under own power  [] Patient left in no apparent distress dining area  [] Patient left in no apparent distress mobilizing under own power  [x] Call bell left within reach  [] Nursing notified  [x] Caregiver present  [] Bed alarm activated   [] Chair alarm activated        Olimpia Cross, PT  11/28/2023

## 2023-11-28 NOTE — PROGRESS NOTES
Pt is a 80year old female admitted to ARU for sepsis. Pt is alert and oriented, alone in the room. Pt states that she lives, alone, on the first floor of a 2 level home with a ramp to enter and a walk in shower ([id number] [place] [address], [facility], [address] [place] [address]). Pt states that prior to fracture she was able to self care and drove herself to appointments. Pt states that she has no DME and denies recall of home care or outpatient therapy history. Pt reflects that she was recently at [facility] for rehab after her fracture. Pt states that she sees Dr Tom Paul as her PCP and fills her medications at [facility]. Pt states that she has received her initial COVID vaccines and thinks she recently received a booster. Pt states that her niece, Uche Kelley in [place], is her POA. Pt further identifies her daughters as Emogene Old" Fernanda Clifford - lives across the road with her spouse, Pamella Hodge. Pt notes that her other daughter, Dino Weathers", lives in the area. Pt states that her  son's wife - Abdiel Brandon lives in [facility]. Pt states that she has a cousin, Mirna Cross, and his wife, Mario Rodriguez Neda live in [facility]. Pt affirms her insurance as medicare and [employer] for life. Pt states that her spouse served 25 years in the [address] [place] [address] and did have a disability rating for COPD. Pt states preference to dc to [facility] for continued therapy at the time of dc from ARU. Sw will discuss with the team and identify a dc date. Pt denies further questions about dc planning. Sw will follow.

## 2023-11-28 NOTE — PROGRESS NOTES
TEAM CONFERENCE FOLLOW-UP  Patient: Pedro Siddiqui (67 y.o. female)  Date: 11/28/2023  Diagnosis: Impaired mobility and ADLs [Z74.09, Z78.9] Impaired mobility and ADLs      Precautions:      Met with patient to discuss the findings from 11/28/2023 Team Conference.     Patient requested further information and/or had questions:   Fabiana Adames

## 2023-11-28 NOTE — PROGRESS NOTES
[facility]  Team Conference  Date: 11/28/2023  ACTIVE MONITORING OF CO-MORBID CONDITIONS/MANAGEMENT OF NEW MEDICAL ISSUES: Impaired mobility and ADLs [Z74.09, Z78.9]    **Please refer to patient's electronic medical record to view the care plan and goals**  NURSING Making gains Yes   Skin Care: Skin Integumentary   Skin Color: Ecchymosis (comment)  Skin Condition/Temp: Dry, Warm  Skin Integrity: Ecchymosis, Wound (see LDA)  Location: R hip    Wound Care: incision - right hip         Pain: Pain Assessment  Pain Assessment: None - Denies Pain (11/28/23 1230)  Pain Level: 0 (11/28/23 1230)  Rios-Crooks Pain Rating: No hurt (11/26/23 1600)  Patient's Stated Pain Goal: Unable to verbalize/indicate pain goal (11/27/23 0403)  Pain Location: Hip (11/26/23 2007)  Pain Orientation: Right (11/26/23 2007)  Pain Descriptors: Aching (11/26/23 2007)  Response to Pain Intervention: Patient satisfied (11/27/23 1120)  Side Effects: No reported side effects (11/27/23 1120)    Bladder/Bowel Urine Assessment  Urinary Status: Bah  Urinary Incontinence: Absent  Urine Color: Yellow/straw  Urine Appearance: Clear  Urine Odor: No odor Stool Assessment  Incontinence: No  Stool Appearance: Soft, Formed  Stool Color: Brown  Stool Amount: Medium  Stool Source: Rectum  Last BM (including prior to admit): 11/27/23   Goal: Incision will remain free of infection upon discharge. Barrier: none      Intervention: Skin assessment per shift      Lab value concerns   No       Occupational Therapy  Making gains  Yes      Goal: Patient will preform toileting with supervision. Barrier: fatigue, pain         Intervention: ADL training            ADL Accessibility Limitations:none          Physical Therapy Making gains Yes   Goal: Patient will preform sit to stand and stand pivot transfers with supervision.        Barrier: Decreased tolerance, standing balance and increased pain of right lower extremity Intervention: Therex, transfer training and balance training         Household Mobility Accessibility Limitations:none                                  Therapy Minutes Density Met: Yes    Therapy Minutes Not met Action/Justification:   [] Pt on medical hold  [] Pt refusing therapy despite encouragement and education on benefits of therapy  [] Pt displays decreased tolerance to therapy  [] Other     CMG Date: tbd  Estimated Discharge Date: 11/30/2023  [x]Rehabilitation goals from IPOC/Treatment plan reviewed  [x]No changes identified  []Goal(s) changed:     Patient needs identified []Caregiver Education   []Family Conference         Recommended Discharge Plan []home alone   []home alone with assist prn    []Home with continuous supervision       []Home with continuous assistance   [] Assisted living                     [x]SNF       CURRENT EQUIPMENT NEEDS:  []Handicap Placard Application    Equipment needed at discharge: defer to snf    ADL Equipment:None - defer to snf    Plan/Adjustments to Plan   [x]Medical conditions exist that require a minimum of 3 times/week physician      oversight and 24-hour rehabilitation nursing to manage/progress the plan of care   [x]Functional deficits require intensive and coordinated therapies to achieve   goals outlined in plan of care    [x]3 hours therapy 5 days/week OR 15 hours therapy over 7 days    [x]Continued plan of care as patient is showing progress and /or has an expectation to benefit    Patient's plan of care has been reviewed and/or adjusted. Continue treatment as outlined. I have led this Team Conference and agree with the plan, Felipa Chowdary MD, 11/28/2023, 2:06 PM  Physician signature:      Date/time:    Team Conference Recorder Amrita Pino  Date 11/28/2023    Team members participating in today's conference.    [x] Darlyn Pacheco, PT     [x] Gabriella Munoz, PT           [x] Guerrero Sotelo, OT         [x] Ivonne Gallo, OT      [x] Alejandro Martin,

## 2023-11-28 NOTE — PROGRESS NOTES
1003 Left message with [facility] hotline ([phone]) for [place], [place]. Awaiting return call. [facility], APS worker stated that they already have her in the system. Report submitted. Marj Edgar stated she will have the report reviewed by her supervisor. 1127 Received called from [facility], [facility] stated that they will keep the report open and track it. Writer notified CHRISTIE Castillo for discharge planning purposes.      Marisela Matias MSN, RN, [facility]  Inpatient Rehab Interim [address] [facility]  [phone]

## 2023-11-28 NOTE — PROGRESS NOTES
Visitor, self identified as Tony Robb - daughter in law - asked to speak with sw regarding pt's dc planning. Sw, [place] (Trisha's spouse) entered pt's room. Pt consented to conversation regarding team conference and dc planning with Tony Robb and Robbin present. Sw reviewed recommendations, pt's request for SNF at dc and dc date of 11/30. Pt states understanding. Conversation with pt, sw and Tony Robb regarding Trisha's desire for pt's needs and preferences to be acknowledged and supported. Pt expressed concern for her daughter, Marianela Romero", and her recent behaviors and actions. Prem Maxim that Linda Aparicio is having a hard time and not currently able to successfully support the pt. Tony Clarisse encouraged pt to express her own wishes and plans. Pt acknowledged. Tony Robb offered to be a point of contact if the pt wanted her to be a supporter. Sw acknowledged offer to the pt and the fact that pt has a medical POA with her niece, Catrachita Parnell. Sw encouraged pt to identify 1 person to be the contact for sharing information to her support system and asking questions of the treatment team. Pt pointed to Tony Robb. Sw verbalized Oley Quiet you saying you want Tony Robb to be your point of contact? \" Pt replied \"yes\" Sw asked if pt agreed to Tony Robb being the point of contact for the remainder of her ARU stay and pt verbalized \"yes. \" Rose shared that Keo Hall, was requesting an update on the pt's care today and asked if the pt was comfortable with Tony Robb providing the update. Pt responded \"yes\". Rose verified that Tony Prajapatiy was comfortable providing the update and she replies \"yes. \"    Dereje Lloyd [phone]    Rose verified that Trisha's contact information was listed on the pt's board and that sw's contact was listed on the board. Pt and Tony Robb both expressed satisfaction with current plan. Sw will continue to follow.

## 2023-11-28 NOTE — PLAN OF CARE
Problem: Physical Therapy - Adult  Goal: By Discharge: Performs mobility at highest level of function for planned discharge setting. See evaluation for individualized goals. Description: Physical Therapy Short Term Goals  Initiated 11/24/2023 and to be accomplished within 7 day(s) (12/1/2023)  1. Patient will supine to sit, sit to supine, roll right, and roll left  in bed with supervision/set-up without use of bed rails for support. 2.  Patient will transfer from bed to chair and chair to bed with CGA using the least restrictive device. 3.  Patient will perform sit to stand with contact guard assist  4. Patient will ambulate with minimal assistance/contact guard assist for 50 feet with the least restrictive device. 5.  Patient will ascend/descend 4 stairs with B handrail(s) with minimal assistance/contact guard assist.    Physical Therapy Long Term Goals  Initiated 11/24/2023 and to be accomplished within 14 day(s) (12/8/2023)  1. Patient will supine to sit, sit to supine, roll right, and roll left in bed with modified independence. 2.  Patient will transfer from bed to chair and chair to bed with supervision/set-up using the least restrictive device. 3.  Patient will perform sit to stand with supervision/set-up. 4.  Patient will ambulate with supervision/set-up for 150 feet with the least restrictive device. 5.  Patient will ascend/descend 4 stairs with B handrail(s) with supervision/set-up    Outcome: Progressing     PHYSICAL THERAPY TREATMENT    Patient: Buck Alvarado (33 y.o. female)  Date: 11/28/2023  Diagnosis: Impaired mobility and ADLs [Z74.09, Z78.9]   Precautions: fall risk, WBAT right LE  Chart, physical therapy assessment, plan of care and goals were reviewed. Time in: 0945  Time out : 1000    Patient seen for: w/c mobility, sit to stand    Pain:  Pt pain was reported as no c/o pain pre-treatment. Pt pain was reported as 7/10 with transitional movement.   Intervention: sitting

## 2023-11-29 PROCEDURE — 1180000000 HC REHAB R&B

## 2023-11-29 PROCEDURE — 97110 THERAPEUTIC EXERCISES: CPT

## 2023-11-29 PROCEDURE — 99232 SBSQ HOSP IP/OBS MODERATE 35: CPT | Performed by: HOSPITALIST

## 2023-11-29 PROCEDURE — 97150 GROUP THERAPEUTIC PROCEDURES: CPT

## 2023-11-29 PROCEDURE — 97535 SELF CARE MNGMENT TRAINING: CPT

## 2023-11-29 PROCEDURE — 97542 WHEELCHAIR MNGMENT TRAINING: CPT

## 2023-11-29 PROCEDURE — 97530 THERAPEUTIC ACTIVITIES: CPT

## 2023-11-29 PROCEDURE — 97116 GAIT TRAINING THERAPY: CPT

## 2023-11-29 PROCEDURE — 6370000000 HC RX 637 (ALT 250 FOR IP): Performed by: HOSPITALIST

## 2023-11-29 PROCEDURE — 51702 INSERT TEMP BLADDER CATH: CPT

## 2023-11-29 PROCEDURE — 97112 NEUROMUSCULAR REEDUCATION: CPT

## 2023-11-29 RX ADMIN — MUPIROCIN: 20 OINTMENT TOPICAL at 20:21

## 2023-11-29 RX ADMIN — Medication 100 MG: at 07:38

## 2023-11-29 RX ADMIN — APIXABAN 5 MG: 5 TABLET, FILM COATED ORAL at 20:21

## 2023-11-29 RX ADMIN — ASPIRIN 81 MG: 81 TABLET, COATED ORAL at 07:38

## 2023-11-29 RX ADMIN — APIXABAN 5 MG: 5 TABLET, FILM COATED ORAL at 07:38

## 2023-11-29 RX ADMIN — MUPIROCIN: 20 OINTMENT TOPICAL at 09:00

## 2023-11-29 RX ADMIN — FUROSEMIDE 20 MG: 20 TABLET ORAL at 07:38

## 2023-11-29 RX ADMIN — FOLIC ACID 1 MG: 1 TABLET ORAL at 07:38

## 2023-11-29 RX ADMIN — ATENOLOL 50 MG: 50 TABLET ORAL at 06:28

## 2023-11-29 RX ADMIN — ACETAMINOPHEN 325MG 650 MG: 325 TABLET ORAL at 09:52

## 2023-11-29 RX ADMIN — THERA TABS 1 TABLET: TAB at 07:38

## 2023-11-29 RX ADMIN — SPIRONOLACTONE 25 MG: 25 TABLET ORAL at 07:38

## 2023-11-29 ASSESSMENT — PAIN SCALES - GENERAL
PAINLEVEL_OUTOF10: 0
PAINLEVEL_OUTOF10: 3
PAINLEVEL_OUTOF10: 0
PAINLEVEL_OUTOF10: 0

## 2023-11-29 ASSESSMENT — PAIN DESCRIPTION - DESCRIPTORS: DESCRIPTORS: ACHING

## 2023-11-29 ASSESSMENT — PAIN SCALES - WONG BAKER: WONGBAKER_NUMERICALRESPONSE: 0

## 2023-11-29 ASSESSMENT — PAIN DESCRIPTION - LOCATION: LOCATION: OTHER (COMMENT)

## 2023-11-29 NOTE — CONSULTS
Room 183: consulted for right medial ankle blister. Pt does not have a blister on either medial or lateral ankle on left or right side. Pt has edema of both lower legs, scattered bruising due to advanced age. Will turn over care to unit nursing staff at this time & sign off.    Sae TIDWELLN, RN, [facility], [address] [place] [address]

## 2023-11-29 NOTE — PLAN OF CARE
Problem: Physical Therapy - Adult  Goal: By Discharge: Performs mobility at highest level of function for planned discharge setting. See evaluation for individualized goals. Description: Physical Therapy Short Term Goals  Initiated 11/24/2023 and to be accomplished within 7 day(s) (12/1/2023)  1. Patient will supine to sit, sit to supine, roll right, and roll left  in bed with supervision/set-up without use of bed rails for support. (Goal Partially met 11/29/23 SBA/S rolling to the right; CGA rolling to the left)   2. Patient will transfer from bed to chair and chair to bed with CGA using the least restrictive device. (Goal Met 11/29/23)  3. Patient will perform sit to stand with contact guard assist (Goal Met 11/29/23)  4. Patient will ambulate with minimal assistance/contact guard assist for 50 feet with the least restrictive device. (Goal Partially Met CGA x 45 feet)  5. Patient will ascend/descend 4 stairs with B handrail(s) with minimal assistance/contact guard assist. (Goal Met 11/29/23)    Physical Therapy Long Term Goals  Initiated 11/24/2023 and to be accomplished within 14 day(s) (12/8/2023)  1. Patient will supine to sit, sit to supine, roll right, and roll left in bed with modified independence. (Goal Not Met SBA/S)    2. Patient will transfer from bed to chair and chair to bed with supervision/set-up using the least restrictive device. (Goal Not Met CGA)  3. Patient will perform sit to stand with supervision/set-up. (Goal Not Met CGA)  4. Patient will ambulate with supervision/set-up for 150 feet with the least restrictive device.  (Goal Not Met CGA x 45 feet with FWW)  5.  Patient will ascend/descend 4 stairs with B handrail(s) with supervision/set-up (Goal Not Met CGA with BHRs)    Outcome: Adequate for Discharge    PHYSICAL THERAPY DISCHARGE NOTE    Patient: Jamesetta Bound (88 y.o. female)  Date: 11/29/2023  Diagnosis: Impaired mobility and ADLs [Z74.09, Z78.9]   Precautions: Fall Posture (WDL) Exception to [facility]    Posture Assessment Forward head, rounded shoulders, kyphotic         BALANCE Discharge Assessment 11/29/2023   Posture Fair (forward, head, rounded shoulders, kyphotic)   Sitting - Static Good   Sitting - Dynamic Good   Standing - Static Fair (with FWW)   Standing - Dynamic Fair; - (with FWW)   Comments        Ability to  small object from floor: NT for safety purposes         BED/CHAIR/WHEELCHAIR TRANSFERS Initial Assessment Discharge Assessment   Rolling Right Contact guard assistance (with verbal/tactile cues to place LLE into knee flexion and push through LLE to roll to the right) Stand by assistance   Rolling Left Minimal assistance (requiring physical assistance to to flex right knee and push through RLE to roll to the left.) Contact guard assistance   Supine to Sit Minimal assistance (Based on OT report) Contact guard assistance (via comng to long sitting 1st followed by advancing RLE off the edge of bed and then LLE.)   Sit to Stand Minimal Assistance Contact guard assistance (with verbal cues for hand/foot placement and sequencing. The patient is noted to be unable to flex left greater than 90 degrees seconadary to increased pain/discomfort; therfore the patient pushes to standing and then slides RLE back until balanced)   Sit to Supine Minimal assistance (based on OT report) Minimal assistance (with assistance required to advance RLE onto mat)   Transfer Assist Score Minimal assistance Contact guard assistance (using stand-step technique without AD and verbal cues to reach for mat and step over, then reach back for mat and control descent)   Comments           Car Transfer Unable to assess Minimal Assistance (with verbal cues to step back until legs touch the car,reach back for sitting surface and control descent.  The patient was able to advance LLE into the car, required assistance to get RLE into car simulator)   Car Type  Car simulator       [employer] activated   [x] Chair alarm activated        IRF-JOSEE Completed: yes      Rozina Shook, PT  11/29/2023

## 2023-11-29 NOTE — PLAN OF CARE
Problem: Safety - Adult  Goal: Free from fall injury  Outcome: Progressing  Flowsheets (Taken 11/28/2023 2010)  Free From Fall Injury: Instruct family/caregiver on patient safety     Problem: Skin/Tissue Integrity  Goal: Absence of new skin breakdown  Description: 1. Monitor for areas of redness and/or skin breakdown  2. Assess vascular access sites hourly  3. Every 4-6 hours minimum:  Change oxygen saturation probe site  4. Every 4-6 hours:  If on nasal continuous positive airway pressure, respiratory therapy assess nares and determine need for appliance change or resting period.   Outcome: Progressing     Problem: ABCDS Injury Assessment  Goal: Absence of physical injury  Outcome: Progressing  Flowsheets (Taken 11/28/2023 2010)  Absence of Physical Injury: Implement safety measures based on patient assessment     Problem: Pain  Goal: Verbalizes/displays adequate comfort level or baseline comfort level  Outcome: Progressing

## 2023-11-29 NOTE — PROGRESS NOTES
Alberto received call from pt's daughter in law, Rachel Garsia, inquiring about dc plans for 11/30. Alberto shared that Sarah Cooper has received the referral and is reviewing. Rachel Garsia states that she will come to see the pt tomorrow and will be willing to transport the pt to the next facility if approved. Rachel Garsia voiced concerns regarding pt's daughter, Yovana Villa, and desire for pt to be safe and healthy. Alberto shared voiced concerns with unit manager. Alberto called [facility] and was told that admissions is on a call and will follow up with alberto tomorrow morning. Alberto will follow.

## 2023-11-30 PROCEDURE — 97542 WHEELCHAIR MNGMENT TRAINING: CPT

## 2023-11-30 PROCEDURE — 99232 SBSQ HOSP IP/OBS MODERATE 35: CPT | Performed by: EMERGENCY MEDICINE

## 2023-11-30 PROCEDURE — 51702 INSERT TEMP BLADDER CATH: CPT

## 2023-11-30 PROCEDURE — 97110 THERAPEUTIC EXERCISES: CPT

## 2023-11-30 PROCEDURE — 97530 THERAPEUTIC ACTIVITIES: CPT

## 2023-11-30 PROCEDURE — 97112 NEUROMUSCULAR REEDUCATION: CPT

## 2023-11-30 PROCEDURE — 6370000000 HC RX 637 (ALT 250 FOR IP): Performed by: HOSPITALIST

## 2023-11-30 PROCEDURE — 6370000000 HC RX 637 (ALT 250 FOR IP): Performed by: EMERGENCY MEDICINE

## 2023-11-30 PROCEDURE — 1180000000 HC REHAB R&B

## 2023-11-30 PROCEDURE — 97116 GAIT TRAINING THERAPY: CPT

## 2023-11-30 RX ORDER — ACETAMINOPHEN 325 MG/1
650 TABLET ORAL EVERY 4 HOURS PRN
Status: DISCONTINUED | OUTPATIENT
Start: 2023-11-30 | End: 2023-12-07 | Stop reason: HOSPADM

## 2023-11-30 RX ORDER — CYCLOSPORINE 0.5 MG/ML
1 EMULSION OPHTHALMIC 2 TIMES DAILY
Status: DISCONTINUED | OUTPATIENT
Start: 2023-11-30 | End: 2023-12-07 | Stop reason: HOSPADM

## 2023-11-30 RX ADMIN — Medication 100 MG: at 08:31

## 2023-11-30 RX ADMIN — APIXABAN 5 MG: 5 TABLET, FILM COATED ORAL at 08:31

## 2023-11-30 RX ADMIN — FOLIC ACID 1 MG: 1 TABLET ORAL at 08:31

## 2023-11-30 RX ADMIN — ACETAMINOPHEN 325MG 650 MG: 325 TABLET ORAL at 23:28

## 2023-11-30 RX ADMIN — ATENOLOL 50 MG: 50 TABLET ORAL at 07:03

## 2023-11-30 RX ADMIN — CYCLOSPORINE 1 DROP: 0.5 EMULSION OPHTHALMIC at 21:35

## 2023-11-30 RX ADMIN — FUROSEMIDE 20 MG: 20 TABLET ORAL at 08:31

## 2023-11-30 RX ADMIN — ASPIRIN 81 MG: 81 TABLET, COATED ORAL at 08:31

## 2023-11-30 RX ADMIN — APIXABAN 5 MG: 5 TABLET, FILM COATED ORAL at 21:35

## 2023-11-30 RX ADMIN — THERA TABS 1 TABLET: TAB at 08:31

## 2023-11-30 RX ADMIN — MUPIROCIN: 20 OINTMENT TOPICAL at 21:36

## 2023-11-30 RX ADMIN — MUPIROCIN: 20 OINTMENT TOPICAL at 08:32

## 2023-11-30 RX ADMIN — SPIRONOLACTONE 25 MG: 25 TABLET ORAL at 08:30

## 2023-11-30 ASSESSMENT — PAIN SCALES - GENERAL
PAINLEVEL_OUTOF10: 0
PAINLEVEL_OUTOF10: 4
PAINLEVEL_OUTOF10: 0

## 2023-11-30 ASSESSMENT — PAIN DESCRIPTION - LOCATION: LOCATION: LEG

## 2023-11-30 ASSESSMENT — PAIN DESCRIPTION - DESCRIPTORS: DESCRIPTORS: ACHING

## 2023-11-30 ASSESSMENT — PAIN SCALES - WONG BAKER
WONGBAKER_NUMERICALRESPONSE: 0
WONGBAKER_NUMERICALRESPONSE: 0

## 2023-11-30 ASSESSMENT — PAIN DESCRIPTION - ORIENTATION: ORIENTATION: RIGHT

## 2023-11-30 NOTE — PLAN OF CARE
Problem: Safety - Adult  Goal: Free from fall injury  Outcome: Progressing  Flowsheets (Taken 11/29/2023 2200)  Free From Fall Injury: Instruct family/caregiver on patient safety     Problem: Skin/Tissue Integrity  Goal: Absence of new skin breakdown  Description: 1. Monitor for areas of redness and/or skin breakdown  2. Assess vascular access sites hourly  3. Every 4-6 hours minimum:  Change oxygen saturation probe site  4. Every 4-6 hours:  If on nasal continuous positive airway pressure, respiratory therapy assess nares and determine need for appliance change or resting period.   Outcome: Progressing     Problem: ABCDS Injury Assessment  Goal: Absence of physical injury  Outcome: Progressing  Flowsheets (Taken 11/29/2023 2200)  Absence of Physical Injury: Implement safety measures based on patient assessment     Problem: Pain  Goal: Verbalizes/displays adequate comfort level or baseline comfort level  Outcome: Progressing  Flowsheets (Taken 11/29/2023 2000)  Verbalizes/displays adequate comfort level or baseline comfort level:   Encourage patient to monitor pain and request assistance   Assess pain using appropriate pain scale

## 2023-11-30 NOTE — PROGRESS NOTES
Sw met with pt, Abdiel Brandon and family friend Dayo Jernigan) with pt's consent. Sw shared lack of available beds at [facility] and need to make a new dc plan. Sw offered to review alternate SNF locations or dc to home. Pt declines interest in alternate SNFs noting how far from home they would be and the change in intensity of therapy. Sw reviewed option of dc to home next week with continued therapy and 24/7 assistance. Pt affirms her ability to pay for care and pt & Abdiel Brandon were able to name several resources in the community to begin looking for an assistant. Sw reviewed option for caregiver education. Abdiel Brandon asked pt's permission to call Sadie Velasquez ([facility]), to review decisions. Sw reviewed conversation and answered questions. Jacy revoiced conversation with the pt. Pt voiced agreement that it was her wish to return home after an extended time on the ARU. Sw will review with the treatment team to determine the new dc date. Sw will follow.

## 2023-11-30 NOTE — PLAN OF CARE
Problem: Physical Therapy - Adult  Goal: By Discharge: Performs mobility at highest level of function for planned discharge setting. See evaluation for individualized goals. Description: Physical Therapy Short Term Goals  Initiated 11/24/2023 and to be accomplished within 7 day(s) (12/1/2023)  1. Patient will supine to sit, sit to supine, roll right, and roll left  in bed with supervision/set-up without use of bed rails for support. (Goal Partially met 11/29/23 SBA/S rolling to the right; CGA rolling to the left)   2. Patient will transfer from bed to chair and chair to bed with CGA using the least restrictive device. (Goal Met 11/29/23)  3. Patient will perform sit to stand with contact guard assist (Goal Met 11/29/23)  4. Patient will ambulate with minimal assistance/contact guard assist for 50 feet with the least restrictive device. (Goal Partially Met CGA x 45 feet)  5. Patient will ascend/descend 4 stairs with B handrail(s) with minimal assistance/contact guard assist. (Goal Met 11/29/23)    Physical Therapy Long Term Goals  Initiated 11/24/2023 and to be accomplished within 14 day(s) (12/8/2023)  1. Patient will supine to sit, sit to supine, roll right, and roll left in bed with modified independence. (Goal Not Met SBA/S)    2. Patient will transfer from bed to chair and chair to bed with supervision/set-up using the least restrictive device. (Goal Not Met CGA)  3. Patient will perform sit to stand with supervision/set-up. (Goal Not Met CGA)  4. Patient will ambulate with supervision/set-up for 150 feet with the least restrictive device.  (Goal Not Met CGA x 45 feet with FWW)  5.  Patient will ascend/descend 4 stairs with B handrail(s) with supervision/set-up (Goal Not Met CGA with BHRs)    Outcome: Progressing     PHYSICAL THERAPY TREATMENT    Patient: Pastor Smalls (4 y.o. female)  Date: 11/30/2023  Diagnosis: Impaired mobility and ADLs [Z74.09, Z78.9]   Precautions: Fall precautions  Chart,

## 2023-12-01 PROBLEM — R53.81 DEBILITY: Status: ACTIVE | Noted: 2023-12-01

## 2023-12-01 PROBLEM — Z51.5 ENCOUNTER FOR PALLIATIVE CARE: Status: ACTIVE | Noted: 2023-12-01

## 2023-12-01 PROBLEM — Z71.89 GOALS OF CARE, COUNSELING/DISCUSSION: Status: ACTIVE | Noted: 2023-12-01

## 2023-12-01 LAB
ANION GAP SERPL CALC-SCNC: 5 MMOL/L (ref 3–18)
BASOPHILS # BLD: 0 K/UL (ref 0–0.1)
BASOPHILS NFR BLD: 0 % (ref 0–2)
BUN SERPL-MCNC: 36 MG/DL (ref 7–18)
BUN/CREAT SERPL: 31 (ref 12–20)
CALCIUM SERPL-MCNC: 8.2 MG/DL (ref 8.5–10.1)
CHLORIDE SERPL-SCNC: 100 MMOL/L (ref 100–111)
CO2 SERPL-SCNC: 31 MMOL/L (ref 21–32)
CREAT SERPL-MCNC: 1.17 MG/DL (ref 0.6–1.3)
DIFFERENTIAL METHOD BLD: ABNORMAL
EOSINOPHIL # BLD: 0.1 K/UL (ref 0–0.4)
EOSINOPHIL NFR BLD: 4 % (ref 0–5)
ERYTHROCYTE [DISTWIDTH] IN BLOOD BY AUTOMATED COUNT: 17.2 % (ref 11.6–14.5)
GLUCOSE SERPL-MCNC: 90 MG/DL (ref 74–99)
HCT VFR BLD AUTO: 26.3 % (ref 35–45)
HGB BLD-MCNC: 8.9 G/DL (ref 12–16)
IMM GRANULOCYTES # BLD AUTO: 0 K/UL (ref 0–0.04)
IMM GRANULOCYTES NFR BLD AUTO: 1 % (ref 0–0.5)
LYMPHOCYTES # BLD: 1.1 K/UL (ref 0.9–3.6)
LYMPHOCYTES NFR BLD: 28 % (ref 21–52)
MCH RBC QN AUTO: 35.7 PG (ref 24–34)
MCHC RBC AUTO-ENTMCNC: 33.8 G/DL (ref 31–37)
MCV RBC AUTO: 105.6 FL (ref 78–100)
MONOCYTES # BLD: 0.5 K/UL (ref 0.05–1.2)
MONOCYTES NFR BLD: 12 % (ref 3–10)
NEUTS SEG # BLD: 2.1 K/UL (ref 1.8–8)
NEUTS SEG NFR BLD: 56 % (ref 40–73)
NRBC # BLD: 0 K/UL (ref 0–0.01)
NRBC BLD-RTO: 0 PER 100 WBC
PLATELET # BLD AUTO: 203 K/UL (ref 135–420)
PMV BLD AUTO: 10.1 FL (ref 9.2–11.8)
POTASSIUM SERPL-SCNC: 3.6 MMOL/L (ref 3.5–5.5)
RBC # BLD AUTO: 2.49 M/UL (ref 4.2–5.3)
SODIUM SERPL-SCNC: 136 MMOL/L (ref 136–145)
WBC # BLD AUTO: 3.8 K/UL (ref 4.6–13.2)

## 2023-12-01 PROCEDURE — 97110 THERAPEUTIC EXERCISES: CPT

## 2023-12-01 PROCEDURE — 6370000000 HC RX 637 (ALT 250 FOR IP): Performed by: HOSPITALIST

## 2023-12-01 PROCEDURE — 80048 BASIC METABOLIC PNL TOTAL CA: CPT

## 2023-12-01 PROCEDURE — 97530 THERAPEUTIC ACTIVITIES: CPT

## 2023-12-01 PROCEDURE — 99222 1ST HOSP IP/OBS MODERATE 55: CPT | Performed by: NURSE PRACTITIONER

## 2023-12-01 PROCEDURE — 1180000000 HC REHAB R&B

## 2023-12-01 PROCEDURE — 97116 GAIT TRAINING THERAPY: CPT

## 2023-12-01 PROCEDURE — 85025 COMPLETE CBC W/AUTO DIFF WBC: CPT

## 2023-12-01 PROCEDURE — 51702 INSERT TEMP BLADDER CATH: CPT

## 2023-12-01 PROCEDURE — 36415 COLL VENOUS BLD VENIPUNCTURE: CPT

## 2023-12-01 PROCEDURE — 6370000000 HC RX 637 (ALT 250 FOR IP): Performed by: EMERGENCY MEDICINE

## 2023-12-01 PROCEDURE — 97112 NEUROMUSCULAR REEDUCATION: CPT

## 2023-12-01 PROCEDURE — 97542 WHEELCHAIR MNGMENT TRAINING: CPT

## 2023-12-01 RX ADMIN — CYCLOSPORINE 1 DROP: 0.5 EMULSION OPHTHALMIC at 20:49

## 2023-12-01 RX ADMIN — APIXABAN 5 MG: 5 TABLET, FILM COATED ORAL at 20:49

## 2023-12-01 RX ADMIN — APIXABAN 5 MG: 5 TABLET, FILM COATED ORAL at 09:49

## 2023-12-01 RX ADMIN — THERA TABS 1 TABLET: TAB at 09:51

## 2023-12-01 RX ADMIN — Medication 100 MG: at 09:51

## 2023-12-01 RX ADMIN — SPIRONOLACTONE 25 MG: 25 TABLET ORAL at 09:49

## 2023-12-01 RX ADMIN — MUPIROCIN: 20 OINTMENT TOPICAL at 20:50

## 2023-12-01 RX ADMIN — CYCLOSPORINE 1 DROP: 0.5 EMULSION OPHTHALMIC at 09:54

## 2023-12-01 RX ADMIN — ASPIRIN 81 MG: 81 TABLET, COATED ORAL at 09:50

## 2023-12-01 RX ADMIN — ATENOLOL 50 MG: 50 TABLET ORAL at 06:22

## 2023-12-01 RX ADMIN — FOLIC ACID 1 MG: 1 TABLET ORAL at 09:49

## 2023-12-01 RX ADMIN — MUPIROCIN: 20 OINTMENT TOPICAL at 09:59

## 2023-12-01 ASSESSMENT — PAIN SCALES - GENERAL
PAINLEVEL_OUTOF10: 0

## 2023-12-01 ASSESSMENT — PAIN SCALES - WONG BAKER: WONGBAKER_NUMERICALRESPONSE: 0

## 2023-12-01 NOTE — CONSULTS
[address]: [phone] ([phone])  [facility]: [address]: [phone]    Patient Name: Rosa Paul  YOB: 1928    Date of Initial Consult: 12/01/2023   Reason for Consult: goals of care   Requesting Provider: Dr Valentino Chute    Primary Care Physician: Cesar Elmore MD      SUMMARY:   Rosa Paul is a 80y.o. year old with a past history of hypertension, chronic urinary retention with chronic sanchez, a fib not on [facility] , who [facility] acute rehab for continued physical and Occupational Therapy for endorsed. Current medical issues leading to Palliative Medicine involvement include: 19-year-old female who [facility] after sustaining a right hip fracture on 11 6 patient posthospitalization was admitted to SNF where she started having rigors and chills she was noted to have hypoxia with oxygen levels in the 80s she [facility] pulmonary emboli in the left lung with pneumonia and pulmonary edema. She was able to be discharged from acute care but was quite weak and admitted to acute rehab on [id number] for continued therapy for endurance and strengthening. Palliative medicine is consulted for goals of care discussions. Barbie Fears PALLIATIVE DIAGNOSES:   Goals of care/advance care plan discussions  Pulmonary embolus  Debility  Encounter for palliative medicine       PLAN:   Goals of care/advance care plan discussions seen along with CHRISTIE Brizuela. Patient was sitting up in recliner she is alert and oriented x 4 and able to participate in her goals of care discussions. She is very astute. We introduced our team and purpose for visit. Patient tells us prior to hip fracture she lives alone. She also has excellent insight as she is likely not able to live alone again.

## 2023-12-01 NOTE — PLAN OF CARE
1400    Patient seen for: There ex, There act, Gait Training, Stair Training, NMRE, balance Training    Pain:  Pt pain was reported as 0/10 pre-treatment. Pt pain was reported as 0/10 post-treatment. Intervention: N/A, the patient reports soreness when actively moving RLE    Patient identified with name and : Yes    SUBJECTIVE:      The patient was cooperative during treatment session and noted to be continuing to make progress with functional mobility to include ascending/descending stairs. OBJECTIVE DATA SUMMARY:    Objective:   Education: Education Provided Comments: The patient was instructed on correct sequencing for ascending/descending stairs using BHRs with step-to technique and getting in and out of a vehicle using car simulator  Education Method: Demonstration;Verbal;Teach Back  Barriers to Learning: None  Education Outcome: Verbalized understanding;Demonstrated understanding;Continued education needed  BED/MAT MOBILITY Daily Assessment    Rolling Right Stand by assistance;Supervision    Rolling Left Stand by assistance;Contact guard assistance    Supine to Sit Contact guard assistance (via coming to long sitting 1st followed by advancing RLE off the edge of bed with CGA and then LLE)    Sit to Supine Contact guard assistance (with assistance required to get RLE onto bed)      TRANSFERS Daily Assessment    Sit to Stand Contact guard assistance    Transfer Assist Score Contact guard assistance  Contact guard assistance          Comments Reinforced correct sequencing with hand/foot placement when performing sit to stand transfers       Car Transfer Contact guard assistance (with verbal cues to step back until legs touch the car,reach back for sitting surface and control descent.  The patient was able to advance LLE into the car, required assistance to get RLE into car simulator)    Car Type Car simulator        GAIT Daily Assessment    Gait Deviations Slow Shamika;Decreased step height (The patient was noted to perform ambulation with a more normalized step-through gait pattern, however step lengths were still decreased)    Assistive device Rolling Walker    Ambulation assistance - surface  Contact guard assistance  Level tile    Distance 132 feet    Comments noted trunk flexion with kyphotic with head down posture, the patient was given verbal cues for ercet posture and holding head up    Ambulation-uneven surface  150 feet level tile  Stand by assistance;Contact guard assistance during afternoon session        STEPS/STAIRS Daily Assessment     Steps/Stairs ambulated 4  6\"    Assistance Required Contact guard assistance    Rail Use Bilateral (with verbal cues for sequencing using step-to technique)    Comments      Curbs/Ramps             BALANCE Daily Assessment    Posture Fair    Sitting - Static Good    Sitting - Dynamic Good;-    Standing - Static  (with FWW)    Standing - Dynamic Fair (with FWW)    Comments        WHEELCHAIR MOBILITY/MANAGEMENT Daily Assessment   Able to Propel 155 feet   Assist Level Modified independent   Curbs/ramps assistance required     Wheelchair management     Comments      THERAPEUTIC EXERCISES Daily Assessment             EXERCISE   Sets   Reps   Active Active Assist   Passive Self ROM   Comments   Ankle Pumps 3 10  [x] [] [] []    Quad Sets/Glut Sets   [] [] [] []    Hamstring Curls 3 10 [x] [] [] [] With yellow TB   Short Arc Quads   [] [] [] []    Heel Slides   [] [] [] []    Straight Leg Raises 3 10 [x] [] [] []    Hip Abd/Add 3 10 [x] [] [] [] Supine clamshells   Long Arc Quads 3 10 [x] [] [] []    Seated Marching   [] [] [] []    Standing Marching   [] [] [] []       [] [] [] []       Neuro Re-Education:  Standing shoulder arch to facilitate the patient reaching across midline and outside standing GABY to illicit righting reactions and hip, knee and ankle strategies for balance to improve performance with ambulation and stair climbing.     ASSESSMENT:  The patient continues to make progress towards LTGs due to increased BLE strength, standing balance/tolerance, weight shifting capacity and activity tolerance  Progression toward goals:  [x]      Improving appropriately and progressing toward goals  []      Improving slowly and progressing toward goals  []      Not making progress toward goals and plan of care will be adjusted      PLAN:  Patient continues to benefit from skilled intervention to address the above impairments. Continue treatment per established plan of care. Discharge Recommendations:  24 hour supervision or assist;Home with Home health PT;Patient would benefit from continued therapy after discharge  Further Equipment Recommendations for Discharge:        Rolling             Estimated Discharge Date: 12/7/23    Activity Tolerance:   Fair  Please refer to the flowsheet for vital signs taken during this treatment.     After treatment:   [] Patient left in no apparent distress in bed  [x] Patient left in no apparent distress sitting up in chair  [] Patient left in no apparent distress in w/c mobilizing under own power  [] Patient left in no apparent distress dining area  [] Patient left in no apparent distress mobilizing under own power  [x] Call bell left within reach  [] Nursing notified  [] Caregiver present  [] Bed alarm activated   [x] Chair alarm activated        Chavez Cardoza, PT  12/1/2023

## 2023-12-01 NOTE — ACP (ADVANCE CARE PLANNING)
Advance Care Planning       General Advance Care Planning (ACP) Conversation      Date of Conversation: 12/1/2023    Conducted with: Patient with Decision Making Capacity, Ronnie Trujillo NP (Palliative) and this writer. Healthcare Decision Maker:    Patient has an [address] (MPOA) that has been scanned in to the EMR. Primary Decision Maker: Alf Hassan - Niece/Nephew - [phone]  Secondary Decision Maker: Lian Sheridano - Child - [phone]  Supplemental (Other) Decision Maker: Evy Stark - Child - [phone]      Content/Action Overview: Has ACP document(s) on file - reflects the patient's care preferences  Reviewed DNR/DNI and patient elects DNR order - completed portable DNR form & placed order. A POLST was completed, with patient and signed by patient. Patient also had a MOST on her chart that has not been scanned in to the EMR. The MOST is [facility]'s version of the DNR (POST). The POLST that was completed, with patient; is a national POST document. Advanced Steps Jossue Gutierrez (Physician Orders for Life Sustaining Treatment)       Date of conversation: 12/1/2023              Location: [facility]                           Length (minutes): 40    Participants:     [x] Patient                    Other persons present:                  Name: Ronnie Trujillo NP (Palliative)                           Name: Gisele Mon. MSW (Palliative)           [facility] ACP Facilitator: Gisele Camacho. Miley Mon. MSW and Ronnie Trujillo NP      Conversation Topics     Review of MPOA. Discussion of goals of care. Completion of POLST document. Cardiopulmonary Resuscitation      \"What do you understand about CPR? \" Response: Patient has full understanding of the risks and burdens of CPR. Patient has elected to not be subjected to any attempts at resuscitation, in the event that her heart and breathing were to stop. consented to a voluntary [address] [place] [address] conversation. Individuals present for the conversation: Patient with Decision Making Capacity, Aris Chopra NP (Palliative) and this writer. Outcome of the conversation and documents completed: This writer, along with Aris Chopra NP, with the Palliative Care team; visited with patient today to offer support and to also review existing medical power of  (MPOA) and discuss goals of care moving forward. Patient reported that she lives alone and realizes that she will likely not be able to live alone in the future. Patient reported that she feels pretty good today; just a little tired. She stated that she is ready to discharge and move forward in her recovery process. Patient was fairly independent with her ADLs and IADLs; prior to this hospitalization. The  is actively working on a discharge plan for patient. Likely skilled nursing facility. Patient was then asked about her healthcare decision maker(s). She stated that she has an MPOA and that it names her niece Mikayla Castellanos as her primary decision maker. That MPOA remains valid and unchanged. Patient was then asked about her goals of care moving forward. It was noted that patient verbalized wanting to be a DNR. The Palliative Care team verified that patient would not want any attempts at resuscitation, in the event that her heart and breathing were to stop. Patient has a MOST document; that is a DNR document for [place]. A POLST was completed, with patient and signed by patient. The POLST is a national document. A copy of the POLST was placed on her chart to be scanned in to the EMR. At this time, patient is a DNR/DNI with Selective Treatments and NO feeding tubes. Recommendations: The Palliative Care team will continue to offer support to patient, at this time. ----------------------------------------------------------------      Hector Quintero Cha.

## 2023-12-02 PROCEDURE — 1180000000 HC REHAB R&B

## 2023-12-02 PROCEDURE — 6370000000 HC RX 637 (ALT 250 FOR IP): Performed by: EMERGENCY MEDICINE

## 2023-12-02 PROCEDURE — 6370000000 HC RX 637 (ALT 250 FOR IP): Performed by: HOSPITALIST

## 2023-12-02 RX ADMIN — APIXABAN 5 MG: 5 TABLET, FILM COATED ORAL at 08:18

## 2023-12-02 RX ADMIN — CYCLOSPORINE 1 DROP: 0.5 EMULSION OPHTHALMIC at 08:18

## 2023-12-02 RX ADMIN — ASPIRIN 81 MG: 81 TABLET, COATED ORAL at 08:18

## 2023-12-02 RX ADMIN — MUPIROCIN: 20 OINTMENT TOPICAL at 08:19

## 2023-12-02 RX ADMIN — Medication 100 MG: at 08:18

## 2023-12-02 RX ADMIN — MUPIROCIN: 20 OINTMENT TOPICAL at 20:22

## 2023-12-02 RX ADMIN — THERA TABS 1 TABLET: TAB at 08:18

## 2023-12-02 RX ADMIN — FOLIC ACID 1 MG: 1 TABLET ORAL at 08:18

## 2023-12-02 RX ADMIN — APIXABAN 5 MG: 5 TABLET, FILM COATED ORAL at 20:30

## 2023-12-02 RX ADMIN — ATENOLOL 50 MG: 50 TABLET ORAL at 06:08

## 2023-12-02 RX ADMIN — CYCLOSPORINE 1 DROP: 0.5 EMULSION OPHTHALMIC at 20:22

## 2023-12-02 ASSESSMENT — PAIN SCALES - GENERAL
PAINLEVEL_OUTOF10: 0

## 2023-12-02 ASSESSMENT — PAIN SCALES - WONG BAKER
WONGBAKER_NUMERICALRESPONSE: 0

## 2023-12-03 VITALS
SYSTOLIC BLOOD PRESSURE: 119 MMHG | HEART RATE: 61 BPM | HEIGHT: 60 IN | OXYGEN SATURATION: 97 % | BODY MASS INDEX: 21.27 KG/M2 | RESPIRATION RATE: 16 BRPM | DIASTOLIC BLOOD PRESSURE: 63 MMHG | TEMPERATURE: 99 F

## 2023-12-03 LAB
ANION GAP SERPL CALC-SCNC: 5 MMOL/L (ref 3–18)
BUN SERPL-MCNC: 37 MG/DL (ref 7–18)
BUN/CREAT SERPL: 29 (ref 12–20)
CALCIUM SERPL-MCNC: 8.3 MG/DL (ref 8.5–10.1)
CHLORIDE SERPL-SCNC: 103 MMOL/L (ref 100–111)
CO2 SERPL-SCNC: 29 MMOL/L (ref 21–32)
CREAT SERPL-MCNC: 1.28 MG/DL (ref 0.6–1.3)
GLUCOSE SERPL-MCNC: 112 MG/DL (ref 74–99)
POTASSIUM SERPL-SCNC: 3.9 MMOL/L (ref 3.5–5.5)
SODIUM SERPL-SCNC: 137 MMOL/L (ref 136–145)

## 2023-12-03 PROCEDURE — 97542 WHEELCHAIR MNGMENT TRAINING: CPT

## 2023-12-03 PROCEDURE — 6370000000 HC RX 637 (ALT 250 FOR IP): Performed by: HOSPITALIST

## 2023-12-03 PROCEDURE — 1180000000 HC REHAB R&B

## 2023-12-03 PROCEDURE — 36415 COLL VENOUS BLD VENIPUNCTURE: CPT

## 2023-12-03 PROCEDURE — 6370000000 HC RX 637 (ALT 250 FOR IP): Performed by: EMERGENCY MEDICINE

## 2023-12-03 PROCEDURE — 97112 NEUROMUSCULAR REEDUCATION: CPT

## 2023-12-03 PROCEDURE — 97110 THERAPEUTIC EXERCISES: CPT

## 2023-12-03 PROCEDURE — 51702 INSERT TEMP BLADDER CATH: CPT

## 2023-12-03 PROCEDURE — 97530 THERAPEUTIC ACTIVITIES: CPT

## 2023-12-03 PROCEDURE — 97116 GAIT TRAINING THERAPY: CPT

## 2023-12-03 PROCEDURE — 80048 BASIC METABOLIC PNL TOTAL CA: CPT

## 2023-12-03 RX ADMIN — CYCLOSPORINE 1 DROP: 0.5 EMULSION OPHTHALMIC at 20:34

## 2023-12-03 RX ADMIN — MUPIROCIN: 20 OINTMENT TOPICAL at 07:58

## 2023-12-03 RX ADMIN — FOLIC ACID 1 MG: 1 TABLET ORAL at 07:55

## 2023-12-03 RX ADMIN — APIXABAN 5 MG: 5 TABLET, FILM COATED ORAL at 20:34

## 2023-12-03 RX ADMIN — THERA TABS 1 TABLET: TAB at 07:55

## 2023-12-03 RX ADMIN — APIXABAN 5 MG: 5 TABLET, FILM COATED ORAL at 07:55

## 2023-12-03 RX ADMIN — ACETAMINOPHEN 325MG 650 MG: 325 TABLET ORAL at 00:12

## 2023-12-03 RX ADMIN — ASPIRIN 81 MG: 81 TABLET, COATED ORAL at 07:55

## 2023-12-03 RX ADMIN — ATENOLOL 50 MG: 50 TABLET ORAL at 06:31

## 2023-12-03 RX ADMIN — MUPIROCIN: 20 OINTMENT TOPICAL at 20:34

## 2023-12-03 RX ADMIN — Medication 100 MG: at 07:55

## 2023-12-03 RX ADMIN — CYCLOSPORINE 1 DROP: 0.5 EMULSION OPHTHALMIC at 07:55

## 2023-12-03 ASSESSMENT — PAIN SCALES - GENERAL
PAINLEVEL_OUTOF10: 0
PAINLEVEL_OUTOF10: 0
PAINLEVEL_OUTOF10: 5
PAINLEVEL_OUTOF10: 4
PAINLEVEL_OUTOF10: 0

## 2023-12-03 ASSESSMENT — PAIN DESCRIPTION - LOCATION
LOCATION: GENERALIZED
LOCATION: GENERALIZED

## 2023-12-03 ASSESSMENT — PAIN SCALES - WONG BAKER
WONGBAKER_NUMERICALRESPONSE: 0

## 2023-12-03 ASSESSMENT — PAIN DESCRIPTION - DESCRIPTORS
DESCRIPTORS: ACHING
DESCRIPTORS: ACHING

## 2023-12-03 NOTE — PLAN OF CARE
Problem: Physical Therapy - Adult  Goal: By Discharge: Performs mobility at highest level of function for planned discharge setting. See evaluation for individualized goals. Description: Physical Therapy Short Term Goals  Initiated 11/24/2023 and to be accomplished within 7 day(s) (12/1/2023)  1. Patient will supine to sit, sit to supine, roll right, and roll left  in bed with supervision/set-up without use of bed rails for support. (Goal Partially met 11/29/23 SBA/S rolling to the right; CGA rolling to the left)   2. Patient will transfer from bed to chair and chair to bed with CGA using the least restrictive device. (Goal Met 11/29/23)  3. Patient will perform sit to stand with contact guard assist (Goal Met 11/29/23)  4. Patient will ambulate with minimal assistance/contact guard assist for 50 feet with the least restrictive device. (Goal Partially Met CGA x 45 feet)  5. Patient will ascend/descend 4 stairs with B handrail(s) with minimal assistance/contact guard assist. (Goal Met 11/29/23)    Physical Therapy Long Term Goals  Initiated 11/24/2023 and to be accomplished within 14 day(s) (12/8/2023)  1. Patient will supine to sit, sit to supine, roll right, and roll left in bed with modified independence. 2.  Patient will transfer from bed to chair and chair to bed with supervision/set-up using the least restrictive device. 3.  Patient will perform sit to stand with supervision/set-up. 4.  Patient will ambulate with supervision/set-up for 150 feet with the least restrictive device. 5.  Patient will ascend/descend 4 stairs with B handrail(s) with supervision/set-up   Outcome: Progressing     PHYSICAL THERAPY TREATMENT    Patient: Emerick Bamberger (10 y.o. female)  Date: 12/3/2023  Diagnosis: Impaired mobility and ADLs [Z74.09, Z78.9]   Precautions: Fall precautions  Chart, physical therapy assessment, plan of care and goals were reviewed. Time in: 0730  Time out : 0900    Patient seen for:  Ther ex, Ther act, Gait training, Transfer Training, Balance training    Pain:  Pt pain was reported as 0/10 pre-treatment. Pt pain was reported as 0/10 post-treatment. Intervention: N/A  Patient identified with name and : Yes    SUBJECTIVE:      The patient demonstrating a significant improvement in her activity tolerance as evidenced by not requiring frequent rest breaks when performing there ex and functional mobility tasks. OBJECTIVE DATA SUMMARY:    Objective:   Education: Education Given To: Patient  Education Provided: Transfer Training;Mobility Training  Education Provided Comments:  The patient was instructed on correct sequencing for ascending/descending stairs using BHRs with step-to technique and getting in and out of a vehicle using car simulator  Education Method: Demonstration;Verbal;Teach Back  Barriers to Learning: None  Education Outcome: Verbalized understanding;Demonstrated understanding;Continued education needed  BED/MAT MOBILITY Daily Assessment    Rolling Right Stand by assistance;Supervision    Rolling Left Stand by assistance;Contact guard assistance    Supine to Sit Stand by assistance;Contact guard assistance    Sit to Supine Contact guard assistance (with assistance required to get RLE onto bed)      TRANSFERS Daily Assessment    Sit to Stand Contact guard assistance (with the patient noted to requires only occaisional cues for hand/foot placement and sequencing)    Transfer Assist Score Contact guard assistance  Contact guard assistance          Comments        Car Transfer      Car Type         GAIT Daily Assessment    Gait Deviations Slow Shamika;Decreased step height (The patient performed ambulation with step-through gait pattern vs step-to, with noted more erect posture and head up vs head being down lookig at feet)    Assistive device Rolling Walker    Ambulation assistance - surface  Stand by assistance  Level tile    Distance 145 feet    Comments The patient ambulates with

## 2023-12-04 PROCEDURE — 97150 GROUP THERAPEUTIC PROCEDURES: CPT

## 2023-12-04 PROCEDURE — 97542 WHEELCHAIR MNGMENT TRAINING: CPT

## 2023-12-04 PROCEDURE — 51702 INSERT TEMP BLADDER CATH: CPT

## 2023-12-04 PROCEDURE — 99232 SBSQ HOSP IP/OBS MODERATE 35: CPT | Performed by: EMERGENCY MEDICINE

## 2023-12-04 PROCEDURE — 97530 THERAPEUTIC ACTIVITIES: CPT

## 2023-12-04 PROCEDURE — 6370000000 HC RX 637 (ALT 250 FOR IP): Performed by: HOSPITALIST

## 2023-12-04 PROCEDURE — 97112 NEUROMUSCULAR REEDUCATION: CPT

## 2023-12-04 PROCEDURE — 6370000000 HC RX 637 (ALT 250 FOR IP): Performed by: EMERGENCY MEDICINE

## 2023-12-04 PROCEDURE — 97110 THERAPEUTIC EXERCISES: CPT

## 2023-12-04 PROCEDURE — 97116 GAIT TRAINING THERAPY: CPT

## 2023-12-04 PROCEDURE — 1180000000 HC REHAB R&B

## 2023-12-04 RX ADMIN — ATENOLOL 50 MG: 50 TABLET ORAL at 06:50

## 2023-12-04 RX ADMIN — APIXABAN 5 MG: 5 TABLET, FILM COATED ORAL at 21:07

## 2023-12-04 RX ADMIN — FOLIC ACID 1 MG: 1 TABLET ORAL at 08:04

## 2023-12-04 RX ADMIN — APIXABAN 5 MG: 5 TABLET, FILM COATED ORAL at 08:04

## 2023-12-04 RX ADMIN — ASPIRIN 81 MG: 81 TABLET, COATED ORAL at 08:04

## 2023-12-04 RX ADMIN — THERA TABS 1 TABLET: TAB at 08:04

## 2023-12-04 RX ADMIN — CYCLOSPORINE 1 DROP: 0.5 EMULSION OPHTHALMIC at 21:07

## 2023-12-04 RX ADMIN — CYCLOSPORINE 1 DROP: 0.5 EMULSION OPHTHALMIC at 08:04

## 2023-12-04 RX ADMIN — MUPIROCIN: 20 OINTMENT TOPICAL at 21:08

## 2023-12-04 RX ADMIN — Medication 100 MG: at 08:04

## 2023-12-04 RX ADMIN — MUPIROCIN: 20 OINTMENT TOPICAL at 08:06

## 2023-12-04 ASSESSMENT — PAIN SCALES - GENERAL
PAINLEVEL_OUTOF10: 0

## 2023-12-04 ASSESSMENT — PAIN SCALES - WONG BAKER
WONGBAKER_NUMERICALRESPONSE: 0
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
## 2023-12-04 NOTE — PALLIATIVE CARE
[address] [facility] Floor: [phone] ([phone])  [facility]: [phone]    Goals of care defined. Palliative team will sign off. Please consult team as needed, if appropriate. Thank you for the Palliative Medicine consult and allowing us to participate in the care of Ms. Schuyler Siu. CODE STATUS -DNR/DNI POLST form completed and placed on chart for scanning.          Navin TIDWELLN, RN, [facility]  Palliative Medicine Inpatient RN  [address]: [phone]

## 2023-12-04 NOTE — PLAN OF CARE
Problem: Safety - Adult  Goal: Free from fall injury  Outcome: Progressing  Flowsheets (Taken 12/3/2023 2039)  Free From Fall Injury: Instruct family/caregiver on patient safety     Problem: Skin/Tissue Integrity  Goal: Absence of new skin breakdown  Description: 1. Monitor for areas of redness and/or skin breakdown  2. Assess vascular access sites hourly  3. Every 4-6 hours minimum:  Change oxygen saturation probe site  4. Every 4-6 hours:  If on nasal continuous positive airway pressure, respiratory therapy assess nares and determine need for appliance change or resting period.   Outcome: Progressing     Problem: ABCDS Injury Assessment  Goal: Absence of physical injury  Outcome: Progressing  Flowsheets (Taken 12/3/2023 2039)  Absence of Physical Injury: Implement safety measures based on patient assessment     Problem: Pain  Goal: Verbalizes/displays adequate comfort level or baseline comfort level  Outcome: Progressing  Flowsheets (Taken 12/3/2023 2000)  Verbalizes/displays adequate comfort level or baseline comfort level:   Encourage patient to monitor pain and request assistance   Assess pain using appropriate pain scale   Administer analgesics based on type and severity of pain and evaluate response

## 2023-12-04 NOTE — PROGRESS NOTES
conducted a Follow up consultation and Spiritual Assessment for Angélica Harris, who is a 80 y. o.,female. The  provided the following Interventions:  Continued the relationship of care and support. Listened empathically. Offered prayer and assurance of continued prayer on patients behalf. Chart reviewed. The following outcomes were achieved:  Patient expressed gratitude for 's visit. Assessment:  There are no further spiritual or Jehovah's witness issues which require Spiritual Care Services interventions at this time. Plan:  Chaplains will continue to follow and will provide pastoral care on an as needed/requested basis.  recommends bedside caregivers page  on duty if patient shows signs of acute spiritual or emotional distress.        [address] [facility]  Staff [address] [facility]   [phone]

## 2023-12-04 NOTE — PROGRESS NOTES
Staff spoke with the patient regarding her discharge date. Patient notified that her discharge date is set for Thursday, 12/7. [place] of choice for home health offered to patient and she stated preference [facility], OT, MSW, an aid and nursing.

## 2023-12-04 NOTE — PLAN OF CARE
Problem: Physical Therapy - Adult  Goal: By Discharge: Performs mobility at highest level of function for planned discharge setting. See evaluation for individualized goals. Description: Physical Therapy Short Term Goals  Initiated 11/24/2023 and to be accomplished within 7 day(s) (12/1/2023)  1. Patient will supine to sit, sit to supine, roll right, and roll left  in bed with supervision/set-up without use of bed rails for support. (Goal Partially met 11/29/23 SBA/S rolling to the right; CGA rolling to the left)   2. Patient will transfer from bed to chair and chair to bed with CGA using the least restrictive device. (Goal Met 11/29/23)  3. Patient will perform sit to stand with contact guard assist (Goal Met 11/29/23)  4. Patient will ambulate with minimal assistance/contact guard assist for 50 feet with the least restrictive device. (Goal Partially Met CGA x 45 feet)  5. Patient will ascend/descend 4 stairs with B handrail(s) with minimal assistance/contact guard assist. (Goal Met 11/29/23)    Physical Therapy Long Term Goals  Initiated 11/24/2023 and to be accomplished within 14 day(s) (12/8/2023)  1. Patient will supine to sit, sit to supine, roll right, and roll left in bed with modified independence. 2.  Patient will transfer from bed to chair and chair to bed with supervision/set-up using the least restrictive device. 3.  Patient will perform sit to stand with supervision/set-up. 4.  Patient will ambulate with supervision/set-up for 150 feet with the least restrictive device. 5.  Patient will ascend/descend 4 stairs with B handrail(s) with supervision/set-up   Outcome: Progressing     PHYSICAL THERAPY TREATMENT    Patient: Shara Cortes (89 y.o. female)  Date: 12/4/2023  Diagnosis: Impaired mobility and ADLs [Z74.09, Z78.9]   Precautions: Falls  Chart, physical therapy assessment, plan of care and goals were reviewed.   Time in: 930  Time out : 1030  Group Time in: 1430   Group Time out :   Comments        WHEELCHAIR MOBILITY/MANAGEMENT Daily Assessment   Able to Propel 155 feet   Assist Level Modified independent   Curbs/ramps assistance required     Wheelchair management     Comments The patient propels wheelchair using BUEs and LLE     THERAPEUTIC EXERCISES Daily Assessment             EXERCISE   Sets   Reps   Active Active Assist   Passive Self ROM   Comments   Ankle Pumps 3 10  [x] [] [] [] Standing   Quad Sets/Glut Sets   [] [] [] []    Hamstring Curls 3 10 [x] [] [] [] With yellow TB   Short Arc Quads   [] [] [] []    Heel Slides   [] [] [] []    Straight Leg Raises   [] [] [] []    Hip Abd/Add   [] [] [] []    Long Arc Quads 3 10 [x] [] [] [] .5# ankle weight RLE, 2 # ankle weight LLE   Seated Marching   [] [] [] []    Standing Marching 3 10 [x] [] [] [] .5# ankle weight RLE, 2 # ankle weight LLE      [] [] [] []    Group therapy treatment:        Standing balance  Activity   Sets   Reps   Time Spent   Comments   [] Beach ball toss/catch       [] Balloon tapping       [x] Forward/multi-directional reaching with tabletop activity  4 games  30 minutes Standing connect four to facilitate the patient reaching across midline and outside standing GABY and to increase standing tolerance   [] Bean bag toss to central target       [] Ball tossing into basketball hoop         [x]   Patient appropriate to continue group therapy sessions to promote increased participation in skilled therapy interventions and to provide opportunities for increased social interaction.  Neuro Re-Education:  Standing bean bag retreive/toss into corn-hole apparatus    ASSESSMENT:  The patient is progressing well and is on schedule for discharge home 12/7/23 with 24 hr supervision from family.  Progression toward goals:  [x]      Improving appropriately and progressing toward goals  []      Improving slowly and progressing toward goals  []      Not making progress toward goals and plan of care will be adjusted

## 2023-12-04 NOTE — PLAN OF CARE
Problem: Occupational Therapy - Adult  Description: Occupational Therapy Goals   Long Term Goals  Initiated 2023 and to be accomplished within 2 week(s) 2023  1. Pt will perform self-feeding with Dinah.  2. Pt will perform grooming with Dinah.  3. Pt will perform UB bathing with Dinah  4. Pt will perform LB bathing with Dinah.  5. Pt will perform tub/shower transfer with Dinah.   6. Pt will perform UB dressing with Dinah.  7. Pt will perform LB dressing with Dinah.  8. Pt will perform toileting task with Dinah.  9. Pt will perform toilet transfer with Dinah.  10.  Pt will perform an IADL task with good safety and Dinah.     Short Term Goals   Initiated 2023 and to be accomplished within 7 day(s) 2023  1. Pt will perform self-feeding with S  GM 23  2. Pt will perform grooming with S.   GM 23  3. Pt will perform UB bathing with S.    GM 23  4. Pt will perform LB bathing with S  5. Pt will perform tub/shower transfer with S.  6. Pt will perform UB dressing with S.   GM 23  7. Pt will perform LB dressing with S  8. Pt will perform toileting task with S.    9. Pt will perform toilet transfer with S. GM 23           Goal: By Discharge: Performs self-care activities at highest level of function for planned discharge setting.  See evaluation for individualized goals.  Outcome: Progressing   Occupational Therapy TREATMENT    Patient: Didi Peguero   89 y.o.    Patient identified with name and : yes    Date: 2023    First Tx Session  Time In: 1035  Time Out: 1205    Diagnosis: Impaired mobility and ADLs [Z74.09, Z78.9]   Precautions:  Restrictions/Precautions: Fall Risk, Weight Bearing  Required Braces or Orthoses?: No     Right Lower Extremity Weight Bearing: Weight Bearing As Tolerated     Chart, occupational therapy assessment, plan of care, and goals were reviewed.     Pain:  Pt reports 0/10 pain or discomfort prior to treatment.    Pt reports 0/10 pain or discomfort  strength, and endurance for increased functional independence with ADL's. Intermittent rest breaks due to fatigue. RUE/ LUE there ex performed using red hand web exerciser (15 reps x2 sets) for web stretch/ pulls and fist press. Verbal cues with demonstration for hand positioning and technique. Performed for UE strength,  strength, and range of motion for carryover to self-cares. Rest break between sets due to fatigue. FUNCTIONAL MOBILITY Daily Assessment    Functional - Mobility Device: Wheelchair  Activity: To/From therapy gym  Assist Level: Supervision  Functional Mobility Comments: Functional w/c mobility performed (supv/ Mod I) to/ from therapy gym; pt propels over level surface using her BUE's and LLE. Contact guard assistance (using FWW; gait belt)      WHEELCHAIR/BED TRANSFER INDEPENDENCE Daily Assessment   Transfer Technique Stand step   Level of Assistance Contact guard assistance   Equipment Wheelchair; Other (FWW; gait belt)   Comments Stand step transfer performed (CGA) using FWW; gait belt (bed > w/c) (performed 12/4/2023). Activity Tolerance:  Patient Tolerated treatment well   Intermittent rest breaks due to fatigue. EDUCATION:   Education Given To: Patient  Education Provided: Transfer Training;Home Exercise Program;Energy Conservation;Mobility Training  Education Method: Demonstration;Verbal  Barriers to Learning: None  Education Outcome: Verbalized understanding;Demonstrated understanding;Continued education needed    ASSESSMENT:  Pt increasing strength, balance, and activity tolerance for increased functional independence with ADL's. Progression toward goals:  [x]          Improving appropriately and progressing toward goals  []          Improving slowly and progressing toward goals  []          Not making progress toward goals and plan of care will be adjusted      PLAN:  Patient continues to benefit from skilled intervention to address the above impairments.   Continue

## 2023-12-05 LAB
ANION GAP SERPL CALC-SCNC: 6 MMOL/L (ref 3–18)
BUN SERPL-MCNC: 31 MG/DL (ref 7–18)
BUN/CREAT SERPL: 27 (ref 12–20)
CALCIUM SERPL-MCNC: 8.4 MG/DL (ref 8.5–10.1)
CHLORIDE SERPL-SCNC: 103 MMOL/L (ref 100–111)
CO2 SERPL-SCNC: 28 MMOL/L (ref 21–32)
CREAT SERPL-MCNC: 1.16 MG/DL (ref 0.6–1.3)
GLUCOSE SERPL-MCNC: 89 MG/DL (ref 74–99)
POTASSIUM SERPL-SCNC: 3.9 MMOL/L (ref 3.5–5.5)
SODIUM SERPL-SCNC: 137 MMOL/L (ref 136–145)

## 2023-12-05 PROCEDURE — 97150 GROUP THERAPEUTIC PROCEDURES: CPT

## 2023-12-05 PROCEDURE — 80048 BASIC METABOLIC PNL TOTAL CA: CPT

## 2023-12-05 PROCEDURE — 97116 GAIT TRAINING THERAPY: CPT

## 2023-12-05 PROCEDURE — 97530 THERAPEUTIC ACTIVITIES: CPT

## 2023-12-05 PROCEDURE — 97110 THERAPEUTIC EXERCISES: CPT

## 2023-12-05 PROCEDURE — 36415 COLL VENOUS BLD VENIPUNCTURE: CPT

## 2023-12-05 PROCEDURE — 6370000000 HC RX 637 (ALT 250 FOR IP): Performed by: HOSPITALIST

## 2023-12-05 PROCEDURE — 97535 SELF CARE MNGMENT TRAINING: CPT

## 2023-12-05 PROCEDURE — 97112 NEUROMUSCULAR REEDUCATION: CPT

## 2023-12-05 PROCEDURE — 1180000000 HC REHAB R&B

## 2023-12-05 PROCEDURE — 6370000000 HC RX 637 (ALT 250 FOR IP): Performed by: EMERGENCY MEDICINE

## 2023-12-05 PROCEDURE — 51702 INSERT TEMP BLADDER CATH: CPT

## 2023-12-05 PROCEDURE — 99232 SBSQ HOSP IP/OBS MODERATE 35: CPT | Performed by: EMERGENCY MEDICINE

## 2023-12-05 RX ADMIN — APIXABAN 5 MG: 5 TABLET, FILM COATED ORAL at 22:22

## 2023-12-05 RX ADMIN — ATENOLOL 50 MG: 50 TABLET ORAL at 06:30

## 2023-12-05 RX ADMIN — FOLIC ACID 1 MG: 1 TABLET ORAL at 09:15

## 2023-12-05 RX ADMIN — APIXABAN 5 MG: 5 TABLET, FILM COATED ORAL at 09:15

## 2023-12-05 RX ADMIN — CYCLOSPORINE 1 DROP: 0.5 EMULSION OPHTHALMIC at 22:23

## 2023-12-05 RX ADMIN — CYCLOSPORINE 1 DROP: 0.5 EMULSION OPHTHALMIC at 09:15

## 2023-12-05 RX ADMIN — MUPIROCIN: 20 OINTMENT TOPICAL at 22:22

## 2023-12-05 RX ADMIN — MUPIROCIN: 20 OINTMENT TOPICAL at 09:16

## 2023-12-05 RX ADMIN — ASPIRIN 81 MG: 81 TABLET, COATED ORAL at 09:15

## 2023-12-05 RX ADMIN — Medication 100 MG: at 09:15

## 2023-12-05 RX ADMIN — THERA TABS 1 TABLET: TAB at 09:15

## 2023-12-05 ASSESSMENT — PAIN SCALES - GENERAL
PAINLEVEL_OUTOF10: 0

## 2023-12-05 ASSESSMENT — PAIN SCALES - WONG BAKER
WONGBAKER_NUMERICALRESPONSE: 0

## 2023-12-05 NOTE — PLAN OF CARE
Problem: Physical Therapy - Adult  Goal: By Discharge: Performs mobility at highest level of function for planned discharge setting. See evaluation for individualized goals. Description: Physical Therapy Short Term Goals  Initiated 11/24/2023 and to be accomplished within 7 day(s) (12/1/2023)  1. Patient will supine to sit, sit to supine, roll right, and roll left  in bed with supervision/set-up without use of bed rails for support. (Goal Partially met 11/29/23 SBA/S rolling to the right; CGA rolling to the left)   2. Patient will transfer from bed to chair and chair to bed with CGA using the least restrictive device. (Goal Met 11/29/23)  3. Patient will perform sit to stand with contact guard assist (Goal Met 11/29/23)  4. Patient will ambulate with minimal assistance/contact guard assist for 50 feet with the least restrictive device. (Goal Partially Met CGA x 45 feet)  5. Patient will ascend/descend 4 stairs with B handrail(s) with minimal assistance/contact guard assist. (Goal Met 11/29/23)    Physical Therapy Long Term Goals  Initiated 11/24/2023 and to be accomplished within 14 day(s) (12/8/2023)  1. Patient will supine to sit, sit to supine, roll right, and roll left in bed with modified independence. 2.  Patient will transfer from bed to chair and chair to bed with supervision/set-up using the least restrictive device. 3.  Patient will perform sit to stand with supervision/set-up. 4.  Patient will ambulate with supervision/set-up for 150 feet with the least restrictive device. 5.  Patient will ascend/descend 4 stairs with B handrail(s) with supervision/set-up   Outcome: Progressing     PHYSICAL THERAPY TREATMENT    Patient: Suzanna Golden (68 y.o. female)  Date: 12/5/2023  Diagnosis: Impaired mobility and ADLs [Z74.09, Z78.9]   Precautions: Fall precautions  Chart, physical therapy assessment, plan of care and goals were reviewed.     Group Time in: 1030  Group Time out : 1100  Time in: 1100  Time out : 1200    Patient seen for: There ex, There act, Gait Training, Transfer Training, Stair Training    Pain:  Pt pain was reported as 0/10 pre-treatment. Pt pain was reported as 0/10 post-treatment. Intervention: N/A    Patient identified with name and : Yes    SUBJECTIVE:      There patient reported feeling \"a little tired today. \"    OBJECTIVE DATA SUMMARY:    Objective:   Education: Education Given To: Patient  Education Provided: Transfer Training;Mobility Training  Education Provided Comments:  The patient was instructed on correct sequencing for ascending/descending stairs using BHRs with step-to technique and getting in and out of a vehicle using car simulator  Education Method: Verbal  Barriers to Learning: Hearing  Education Outcome: Verbalized understanding;Continued education needed  BED/MAT MOBILITY Daily Assessment    Rolling Right Stand by assistance;Supervision    Rolling Left Stand by assistance;Contact guard assistance    Supine to Sit Stand by assistance;Contact guard assistance    Sit to Supine Contact guard assistance (with asssitance required for RLE)      TRANSFERS Daily Assessment    Sit to Stand Stand by assistance;Contact guard assistance    Transfer Assist Score Stand by assistance;Contact guard assistance  Stand by assistance;Contact guard assistance          Comments        Car Transfer Contact guard assistance    Car Type Car simulator        GAIT Daily Assessment    Gait Deviations Slow Shamika    Assistive device Rolling Walker    Ambulation assistance - surface  Stand by assistance  Level tile    Distance 155 feet with continuous steps and step-through gait pattern    Comments noted trunk flexion with kyphotic with head down posture, the patient was given verbal cues for erect posture and holding head up     Ambulation-uneven surface              STEPS/STAIRS Daily Assessment     Steps/Stairs ambulated 4  6\"    Assistance Required Contact guard assistance (with verbal cues for sequencing using step-to technique)    Rail Use Bilateral    Comments      Curbs/Ramps             BALANCE Daily Assessment    Posture Good    Sitting - Static Good    Sitting - Dynamic Good;-    Standing - Static Fair;+ (with FWW)    Standing - Dynamic Fair (with FWW)    Comments        WHEELCHAIR MOBILITY/MANAGEMENT Daily Assessment   Able to Propel     Assist Level     Curbs/ramps assistance required     Wheelchair management     Comments      THERAPEUTIC EXERCISES Daily Assessment    Standing Marches, knee flexion with TB sitting edge of mat 3 x 10 reps       Group therapy treatment:    Seated  EXERCISE   Sets   Reps   Active Active Assist   Comments   Ankle Pumps 3 10 [x] []    Long Arc Quads 3 10 [x] []    Seated Marching   [] []    Heel slides   [] []    Hip Abd/ER clamshells w/ T-band   [] []    Hip Abd  3 10 [x] [] With TB   Hip Add Isometrics 3 10 [x] [] With Bolster   Heel taps on floor   [] []    Wheelchair pushups   [] []            [x]   Patient appropriate to continue group therapy sessions to promote increased participation in skilled therapy interventions and to provide opportunities for increased social interaction.    Neuro Re-Education:  Standing activity using shoulder to facilitate the patient reaching across midline and outside standing GABY to increase standing balance/tolerance and weight shifting capacity.    ASSESSMENT:  The patient's activity tolerance has significantly improved since initial evaluation leading to improvement in ambulation distance.  Progression toward goals:  [x]      Improving appropriately and progressing toward goals  []      Improving slowly and progressing toward goals  []      Not making progress toward goals and plan of care will be adjusted      PLAN:  Patient continues to benefit from skilled intervention to address the above impairments.  Continue treatment per established plan of care.  Discharge Recommendations:  24 hour supervision or assist;Home

## 2023-12-05 NOTE — PLAN OF CARE
Problem: Safety - Adult  Goal: Free from fall injury  Outcome: Progressing  Flowsheets (Taken 12/4/2023 2032)  Free From Fall Injury: Instruct family/caregiver on patient safety     Problem: Skin/Tissue Integrity  Goal: Absence of new skin breakdown  Description: 1. Monitor for areas of redness and/or skin breakdown  2. Assess vascular access sites hourly  3. Every 4-6 hours minimum:  Change oxygen saturation probe site  4. Every 4-6 hours:  If on nasal continuous positive airway pressure, respiratory therapy assess nares and determine need for appliance change or resting period.   Outcome: Progressing     Problem: ABCDS Injury Assessment  Goal: Absence of physical injury  Outcome: Progressing  Flowsheets (Taken 12/4/2023 2032)  Absence of Physical Injury: Implement safety measures based on patient assessment     Problem: Pain  Goal: Verbalizes/displays adequate comfort level or baseline comfort level  Outcome: Progressing

## 2023-12-05 NOTE — PLAN OF CARE
Problem: Occupational Therapy - Adult  Description: Occupational Therapy Goals   Long Term Goals  Initiated 2023 and to be accomplished within 2 week(s) 2023  1. Pt will perform self-feeding with Dinah. GM 23  2. Pt will perform grooming with Dinah. GM 23  3. Pt will perform UB bathing with Dinah  4. Pt will perform LB bathing with Dinah. 5. Pt will perform tub/shower transfer with Dinah. 6. Pt will perform UB dressing with Dinah. 7. Pt will perform LB dressing with Dinah. 8. Pt will perform toileting task with Dinah. 9. Pt will perform toilet transfer with Dinah. 10.  Pt will perform an IADL task with good safety and Dinah. Short Term Goals   Initiated 2023 and to be accomplished within 7 day(s) 2023; updated 23; updated 23  1. Pt will perform self-feeding with S  GM 23  2. Pt will perform grooming with S.   GM 23  3. Pt will perform UB bathing with S.    GM 23  4. Pt will perform LB bathing with S     GM 23  5. Pt will perform tub/shower transfer with S.  6. Pt will perform UB dressing with S.    23  7. Pt will perform LB dressing with S  8. Pt will perform toileting task with S.    9. Pt will perform toilet transfer with S.  23           Goal: By Discharge: Performs self-care activities at highest level of function for planned discharge setting. See evaluation for individualized goals. Outcome: Progressing   OT WEEKLY PROGRESS NOTE  Patient Sarah Suarez    First Treatment Session  Time In:  6486  Time Out:  9905    Medical Diagnosis:  Impaired mobility and ADLs [Z74.09, Z78.9] Impaired mobility and ADLs     Precautions: Fall Risk, Weight Bearing, Right Lower Extremity Weight Bearing: Weight Bearing As Tolerated,  ,  ,  ,  ,  ,      Pain at start of tx:0/10 pain or discomfort. Pain at stop of tx:0/10 pain or discomfort. Patient identified with name and : Yes  Subjective: What do I do next?        Objective: pain pt declined shower     UPPER BODY DRESSING/UNDRESSING Initial Assessment Weekly Assessment 12/5/2023   Functional Level Setup, Stand by assistance UE Dressing: Supervision   Clinical Factors Pt thread BUE through scrub shirt and pulled down around trunk UE Dressing Skilled Clinical Factors: Pt thread BUE through shirt and pulled around trunk     LOWER BODY DRESSING/UNDRESSING Initial Assessment Discharge Assessment 12/5/2023   Functional Level Moderate assistance LE Dressing: Supervision;Stand by assistance;Contact guard assistance   Clinical Factors Pt thread BLE through pants and performed clothes management in standing requiring Moda LE Dressing Skilled Clinical Factors: . TOILETING Initial Assessment Weekly Progress Assessment 12/5/2023   Functional Level Unable to assess(comment)  Moda   Clinical factors 2/2 safety and 7/10 pain       TOILET TRANSFER INDEPENDENCE Initial Assessment Weekly Progress Assessment 12/5/2023   Transfer score Unable to assess     Tub transfer comments 2/2 to safety concerns and 7/10 pain Tub Transfers Comments: pt declined shower        [employer] to increase FM dexterity/manipulation, LUE strength and problem solving for ADLs. Pt required Jeremy to problem solve placement of game chips. Therex:  Theraflex bar(yellow) supination/pronation 3x10. UB bike up to 10 minutes with min-mod resistance  Pt propelled w/c from room to gym to increase BUE strength and endurance for ADLs. Pt required S for safety. ASSESSMENT:  Pt seen for Skilled Occupational Therapy since 11/24/23 to address functional deficits in ADLs/IADLs. Pt participated in therapeutic exercise, therapeutic activity, neuromuscular re-education, transfer training, education/instruction in using assistive devices/ compensatory strategies, and bathing/ dressing retraining. Pt currently shows CGA-SBA in most ADLs/IADLs meeting 6/9 STG's.   Pt noted to require vcs for sequencing bathing, dressing, and functional

## 2023-12-05 NOTE — PROGRESS NOTES
7/10 pain Tub Transfers  Tub Transfers  Tub Transfers: Not tested  Tub Transfers Comments: pt declined shower  SHOWER Transfers  Tub Transfers  Tub Transfers: Not tested  Tub Transfers Comments: pt declined shower     Legend:   7 - Independent   6 - Modified Independent   5 - Standby Assistance / Supervision / Set-up   4 - Minimum Assistance / Contact Guard Assistance   3 - Moderate Assistance   2 - Maximum Assistance   1 - Total Assistance / Dependent             Plan:     1. Justification for continued stay: Good progression towards established rehabilitation goals. Patient is now able to ambulate 155 feet with a rolling walker and standby assistance. In comparison at the time of admission patient was only able to ambulate 3 feet with a rolling walker and minimal assistance. He    2. Medical Issues being followed closely:    [x]  Fall and safety precautions     []  Wound Care     [x]  Bowel and Bladder Function     [x]  Fluid Electrolyte and Nutrition Balance     []  Pain Control      3. Issues that 24 hour rehabilitation nursing is following:    [x]  Fall and safety precautions     []  Wound Care     [x]  Bowel and Bladder Function     [x]  Fluid Electrolyte and Nutrition Balance     []  Pain Control      [x]  Assistance with and education on in-room safety with transfers to and from the bed, wheelchair, toilet and shower. 4. Acute rehabilitation plan of care:    [x]  Continue current care and rehab. [x]  Physical Therapy           [x]  Occupational Therapy           []  Speech Therapy      []  Hold Rehab until further notice     5. Medications:    [x]  MAR Reviewed     [x]  Continue Present Medications       6. Chemical DVT Prophylaxis:      []  Enoxaparin     []  Unfractionated Heparin     []  Warfarin     [x]  NOAC     []  Aspirin     []  None     7. Mechanical DVT Prophylaxis:      []  BANDAR Stockings     [x]  Sequential Compression Device     []  None     8.  GI Prophylaxis:      []  PPI []  H2 Blocker     [x]  None / Not indicated     9. Code status:DNR     Dragon medical dictation software was used for portions of this report. Unintended errors may occur.  Personal Protective Equipment ( face mask ) was used while interacting with the patient        Signed:    Chuck Hermosillo MD      December 5, 2023

## 2023-12-05 NOTE — PROGRESS NOTES
[facility]  Team Conference  Date: 12/5/2023  ACTIVE MONITORING OF CO-MORBID CONDITIONS/MANAGEMENT OF NEW MEDICAL ISSUES: Impaired mobility and ADLs [Z74.09, Z78.9]    **Please refer to patient's electronic medical record to view the care plan and goals**  NURSING Making gains Yes   Skin Care: Skin Integumentary   Skin Color: Ecchymosis (comment)  Skin Condition/Temp: Dry, Warm, Fragile  Skin Integrity: Incision (see LDA)  Location: sacrun-m,right hip    Wound Care: right hip incision - steri strips open to air         Pain: Pain Assessment  Pain Assessment: None - Denies Pain (12/05/23 1200)  Pain Level: 0 (12/05/23 1200)  Rios-Crooks Pain Rating: No hurt (12/05/23 1200)  Patient's Stated Pain Goal: 0 - No pain (12/05/23 1200)  Pain Location: Generalized (12/03/23 0045)  Pain Orientation: Right (11/30/23 1578)  Pain Descriptors: Aching (12/03/23 0045)  Non-Pharmaceutical Pain Intervention(s): Rest;Repositioned (12/03/23 0045)  Response to Pain Intervention: Pain improved but above pain goal (12/03/23 0045)  Side Effects: No reported side effects (12/03/23 0045)    Bladder/Bowel Urine Assessment  Urinary Status: Sanchez, Draining  Urinary Incontinence: Absent  Urine Color: Melba  Urine Appearance: Clear  Urine Odor: No odor Stool Assessment  Incontinence: No  Stool Appearance: Soft  Stool Color: Brown  Stool Amount: Medium  Stool Source: Rectum  Last BM (including prior to admit): 12/04/23   Goal: Patient will be free of sanchez associated infection. Barrier: sanchez      Intervention: Sanchez care and education      Lab value concerns   Yes       Occupational Therapy  Making gains  Yes      Goal: Patient will perform bathing and dressing without verbal cues to sequence tasks.           Barrier: Cognition, fatigue, decreased strength         Intervention: Therex, ADL training            ADL Accessibility Limitations:none          Physical Therapy Making gains Yes   Goal: Patient will 12/5/2023    Team members participating in today's conference.    [x] Bran Edward, PT     [x] Lulu Multani, PT           [x] Surjit Grullon, OT         [x] Ruthy Natarajan, OT      [x] Monica Aldana, OT                    [x]  Lucille Braun, TIEN    [x] Chase Mendiola, NAYANA        [x] Jennifer Aleman RN              [x] Vimal Danielson NP  [] RN:                              [] Other:

## 2023-12-06 ENCOUNTER — APPOINTMENT (OUTPATIENT)
Facility: HOSPITAL | Age: 88
DRG: 871 | End: 2023-12-06
Attending: HOSPITALIST
Payer: MEDICARE

## 2023-12-06 LAB

## 2023-12-06 PROCEDURE — 97110 THERAPEUTIC EXERCISES: CPT

## 2023-12-06 PROCEDURE — 6370000000 HC RX 637 (ALT 250 FOR IP): Performed by: EMERGENCY MEDICINE

## 2023-12-06 PROCEDURE — 97542 WHEELCHAIR MNGMENT TRAINING: CPT

## 2023-12-06 PROCEDURE — 0202U NFCT DS 22 TRGT SARS-COV-2: CPT

## 2023-12-06 PROCEDURE — 99232 SBSQ HOSP IP/OBS MODERATE 35: CPT | Performed by: EMERGENCY MEDICINE

## 2023-12-06 PROCEDURE — 1180000000 HC REHAB R&B

## 2023-12-06 PROCEDURE — 97116 GAIT TRAINING THERAPY: CPT

## 2023-12-06 PROCEDURE — 51702 INSERT TEMP BLADDER CATH: CPT

## 2023-12-06 PROCEDURE — 97112 NEUROMUSCULAR REEDUCATION: CPT

## 2023-12-06 PROCEDURE — 97535 SELF CARE MNGMENT TRAINING: CPT

## 2023-12-06 PROCEDURE — 71045 X-RAY EXAM CHEST 1 VIEW: CPT

## 2023-12-06 PROCEDURE — 6370000000 HC RX 637 (ALT 250 FOR IP): Performed by: HOSPITALIST

## 2023-12-06 PROCEDURE — 97530 THERAPEUTIC ACTIVITIES: CPT

## 2023-12-06 RX ADMIN — THERA TABS 1 TABLET: TAB at 08:35

## 2023-12-06 RX ADMIN — ATENOLOL 50 MG: 50 TABLET ORAL at 06:32

## 2023-12-06 RX ADMIN — ASPIRIN 81 MG: 81 TABLET, COATED ORAL at 08:35

## 2023-12-06 RX ADMIN — MUPIROCIN: 20 OINTMENT TOPICAL at 21:41

## 2023-12-06 RX ADMIN — APIXABAN 5 MG: 5 TABLET, FILM COATED ORAL at 08:35

## 2023-12-06 RX ADMIN — SPIRONOLACTONE 25 MG: 25 TABLET ORAL at 08:34

## 2023-12-06 RX ADMIN — FUROSEMIDE 20 MG: 20 TABLET ORAL at 08:35

## 2023-12-06 RX ADMIN — FOLIC ACID 1 MG: 1 TABLET ORAL at 08:35

## 2023-12-06 RX ADMIN — CYCLOSPORINE 1 DROP: 0.5 EMULSION OPHTHALMIC at 21:42

## 2023-12-06 RX ADMIN — MUPIROCIN: 20 OINTMENT TOPICAL at 08:36

## 2023-12-06 RX ADMIN — Medication 100 MG: at 08:34

## 2023-12-06 RX ADMIN — CYCLOSPORINE 1 DROP: 0.5 EMULSION OPHTHALMIC at 08:35

## 2023-12-06 RX ADMIN — APIXABAN 5 MG: 5 TABLET, FILM COATED ORAL at 21:42

## 2023-12-06 ASSESSMENT — PAIN SCALES - GENERAL
PAINLEVEL_OUTOF10: 0

## 2023-12-06 ASSESSMENT — PAIN SCALES - WONG BAKER
WONGBAKER_NUMERICALRESPONSE: 0

## 2023-12-06 NOTE — PLAN OF CARE
file.  Previous Functional Level & Home Environment:   Lives With: Alone  Type of Home: House  Home Layout: Two level  Home Access: Stairs to enter with rails, Ramped entrance  Bathroom Shower/Tub: Walk-in shower  Bathroom Toilet: Standard  Bathroom Equipment: Grab bars in shower, Grab bars around toilet  Bathroom Accessibility: Accessible  Home Equipment: Walker, rolling  Has the patient had two or more falls in the past year or any fall with injury in the past year?: No  ADL Assistance: Independent  Homemaking Assistance: Independent  Homemaking Responsibilities: Yes  Ambulation Assistance: Independent  Transfer Assistance: Independent  Active : Yes  Mode of Transportation: Car      [x]     Right hand dominant   []     Left hand dominant     Therapeutic Exercise:  Bicep curls and chest pulls 3x10 with 2lb weights in hand    Pain:  Pt reports 0/10 pain or discomfort prior to treatment.    Pt reports 0/10 pain or discomfort post treatment.   Intervention: N/A    Outcome Measures:      MMT Initial Assessment    Right Upper Extremity  Left Upper Extremity    Shoulder flexion 4/5 4/5   Shoulder extension 4/5 4/5   Elbow Flexion 4/5 4/5   Elbow Extension 4/5 4/5   Wrist Extension/Flexion 4/5 4/5    4/5 4/5       MMT Discharge Assessment   Right Upper Extremity  Left Upper Extremity    UE AROM WFL WFL   Shoulder flexion 4/5 4/5   Shoulder extension 4/5 4/5   Elbow Flexion 4/5 4/5   Elbow Extension 4/5 4/5   Wrist Extension/Flexion 4/5 4/5    4/5 4/5     0/5 No palpable muscle contraction  1/5 Palpable muscle contraction, no joint movement  2-/5 Less than full range of motion in gravity eliminated position  2/5 Able to complete full range of motion in gravity eliminated position  2+/5 Able to initiate movement against gravity  3-/5 More than half but not full range of motion against gravity  3/5 Able to complete full range of motion against gravity  3+/5 Completes full range of motion against gravity with  address functional deficits in ADLs/IADLs. Pt participated in therapeutic exercise, therapeutic activity, neuromuscular re-education, transfer training, education/instruction in using assistive devices/ compensatory strategies, and bathing/ dressing retraining. Pt currently shows CGA-SBA in most ADLs/IADLs meeting 8/9 STG's and 2/10 LTG's. PLAN:  Maximum therapeutic gains met at current level of care and patient will be discharged from occupational therapy at this time. Rationale for discharge:  [x] Partial Goals Achieved  [] [address]  [] Patient not participating in therapy  [] Other:    Discharge Recommendations:     Further Equipment Recommendations for Discharge:         IRF JOSEE completed: Yes  Entered Differentiated Treatment minutes: Yes     Please refer to the flow sheet for vital signs taken during this treatment. After treatment:   [x]  Patient left in no apparent distress sitting up in chair  []  Patient left in no apparent distress in bed  []  Call bell left within reach  []  Nursing notified  []  Caregiver present  []  Bed alarm activated    COMMUNICATION/EDUCATION:   [] Home safety education was provided and the patient/caregiver indicated understanding. [x] Patient/family have participated as able in goal setting and plan of care. [] Patient/family agree to work toward stated goals and plan of care. [] Patient understands intent and goals of therapy, but is neutral about his/her participation. [] Patient is unable to participate in goal setting and plan of care. [address] [facility],   12/6/2023     Goal: By Discharge: Performs self-care activities at highest level of function for planned discharge setting. See evaluation for individualized goals.   Outcome: Progressing

## 2023-12-06 NOTE — PROGRESS NOTES
TEAM CONFERENCE FOLLOW-UP  Patient: Buck Alvarado (22 y.o. female)  Date: 12/6/2023  Diagnosis: Impaired mobility and ADLs [Z74.09, Z78.9] Impaired mobility and ADLs      Precautions:      Met with patient to discuss the findings from 12/6/2023 Team Conference.     Patient requested further information and/or had questions:   No        Vesta Big

## 2023-12-06 NOTE — PLAN OF CARE
Problem: Physical Therapy - Adult  Goal: By Discharge: Performs mobility at highest level of function for planned discharge setting. See evaluation for individualized goals. Description: Physical Therapy Short Term Goals  Initiated 11/24/2023 and to be accomplished within 7 day(s) (12/1/2023)  1. Patient will supine to sit, sit to supine, roll right, and roll left  in bed with supervision/set-up without use of bed rails for support. (Goal Partially met 11/29/23 SBA/S rolling to the right; CGA rolling to the left)   2. Patient will transfer from bed to chair and chair to bed with CGA using the least restrictive device. (Goal Met 11/29/23)  3. Patient will perform sit to stand with contact guard assist (Goal Met 11/29/23)  4. Patient will ambulate with minimal assistance/contact guard assist for 50 feet with the least restrictive device. (Goal Partially Met CGA x 45 feet)  5. Patient will ascend/descend 4 stairs with B handrail(s) with minimal assistance/contact guard assist. (Goal Met 11/29/23)    Physical Therapy Long Term Goals  Initiated 11/24/2023 and to be accomplished within 14 day(s) (12/8/2023)  1. Patient will supine to sit, sit to supine, roll right, and roll left in bed with modified independence. (Goal Met 12/6/23)   2. Patient will transfer from bed to chair and chair to bed with supervision/set-up using the least restrictive device. (Goal Not SBA/CGA 12/6/23)  3. Patient will perform sit to stand with supervision/set-up. (Goal Not Met SBA/CGA 12/6/23)  4. Patient will ambulate with supervision/set-up for 150 feet with the least restrictive device.  (Goal Partially Met SBA/S x 155 feet with FWW)  5.  Patient will ascend/descend 4 stairs with B handrail(s) with supervision/set-up (Goal Not Met SBA/CGA)  Outcome: Progressing Adequate For Discharge     PHYSICAL THERAPY DISCHARGE NOTE    Patient: Laurel Goldberg (89 y.o. female)  Date: 12/6/2023  Diagnosis: Impaired mobility and ADLs [Z74.09,

## 2023-12-06 NOTE — PLAN OF CARE
Problem: Safety - Adult  Goal: Free from fall injury  Outcome: Progressing  Flowsheets (Taken 12/5/2023 2307)  Free From Fall Injury: Instruct family/caregiver on patient safety     Problem: Skin/Tissue Integrity  Goal: Absence of new skin breakdown  Description: 1. Monitor for areas of redness and/or skin breakdown  2. Assess vascular access sites hourly  3. Every 4-6 hours minimum:  Change oxygen saturation probe site  4. Every 4-6 hours:  If on nasal continuous positive airway pressure, respiratory therapy assess nares and determine need for appliance change or resting period.   Outcome: Progressing     Problem: ABCDS Injury Assessment  Goal: Absence of physical injury  Outcome: Progressing  Flowsheets (Taken 12/5/2023 2307)  Absence of Physical Injury: Implement safety measures based on patient assessment     Problem: Pain  Goal: Verbalizes/displays adequate comfort level or baseline comfort level  Outcome: Progressing

## 2023-12-07 VITALS
WEIGHT: 115 LBS | HEIGHT: 61 IN | TEMPERATURE: 97.1 F | SYSTOLIC BLOOD PRESSURE: 119 MMHG | HEART RATE: 73 BPM | RESPIRATION RATE: 17 BRPM | OXYGEN SATURATION: 95 % | DIASTOLIC BLOOD PRESSURE: 70 MMHG | BODY MASS INDEX: 21.71 KG/M2

## 2023-12-07 LAB
ERYTHROCYTE [DISTWIDTH] IN BLOOD BY AUTOMATED COUNT: 17 % (ref 11.6–14.5)
HCT VFR BLD AUTO: 25.5 % (ref 35–45)
HGB BLD-MCNC: 8.6 G/DL (ref 12–16)
MCH RBC QN AUTO: 36.1 PG (ref 24–34)
MCHC RBC AUTO-ENTMCNC: 33.7 G/DL (ref 31–37)
MCV RBC AUTO: 107.1 FL (ref 78–100)
NRBC # BLD: 0 K/UL (ref 0–0.01)
NRBC BLD-RTO: 0 PER 100 WBC
PLATELET # BLD AUTO: 152 K/UL (ref 135–420)
PMV BLD AUTO: 10.2 FL (ref 9.2–11.8)
RBC # BLD AUTO: 2.38 M/UL (ref 4.2–5.3)
WBC # BLD AUTO: 4.3 K/UL (ref 4.6–13.2)

## 2023-12-07 PROCEDURE — 97530 THERAPEUTIC ACTIVITIES: CPT

## 2023-12-07 PROCEDURE — 97110 THERAPEUTIC EXERCISES: CPT

## 2023-12-07 PROCEDURE — 6370000000 HC RX 637 (ALT 250 FOR IP): Performed by: EMERGENCY MEDICINE

## 2023-12-07 PROCEDURE — 85027 COMPLETE CBC AUTOMATED: CPT

## 2023-12-07 PROCEDURE — 97542 WHEELCHAIR MNGMENT TRAINING: CPT

## 2023-12-07 PROCEDURE — 97116 GAIT TRAINING THERAPY: CPT

## 2023-12-07 PROCEDURE — 6370000000 HC RX 637 (ALT 250 FOR IP): Performed by: HOSPITALIST

## 2023-12-07 PROCEDURE — 99239 HOSP IP/OBS DSCHRG MGMT >30: CPT | Performed by: NURSE PRACTITIONER

## 2023-12-07 PROCEDURE — 36415 COLL VENOUS BLD VENIPUNCTURE: CPT

## 2023-12-07 RX ORDER — THIAMINE MONONITRATE (VIT B1) 100 MG
100 TABLET ORAL DAILY
Qty: 30 TABLET | Refills: 0 | Status: SHIPPED | OUTPATIENT
Start: 2023-12-07

## 2023-12-07 RX ORDER — POLYETHYLENE GLYCOL 3350 17 G/17G
17 POWDER, FOR SOLUTION ORAL DAILY PRN
Qty: 10 EACH | Refills: 0 | Status: SHIPPED | OUTPATIENT
Start: 2023-12-07

## 2023-12-07 RX ORDER — ATENOLOL 50 MG/1
50 TABLET ORAL DAILY
Qty: 30 TABLET | Refills: 0 | Status: SHIPPED | OUTPATIENT
Start: 2023-12-07

## 2023-12-07 RX ORDER — FOLIC ACID 1 MG/1
1 TABLET ORAL DAILY
Qty: 30 TABLET | Refills: 0 | Status: SHIPPED | OUTPATIENT
Start: 2023-12-07

## 2023-12-07 RX ORDER — ACETAMINOPHEN 325 MG/1
650 TABLET ORAL EVERY 4 HOURS PRN
Qty: 20 TABLET | Refills: 0
Start: 2023-12-07

## 2023-12-07 RX ORDER — SPIRONOLACTONE 25 MG/1
25 TABLET ORAL DAILY
Qty: 30 TABLET | Refills: 0 | Status: SHIPPED | OUTPATIENT
Start: 2023-12-08

## 2023-12-07 RX ORDER — FUROSEMIDE 20 MG/1
20 TABLET ORAL DAILY
Qty: 60 TABLET | Refills: 0 | Status: SHIPPED | OUTPATIENT
Start: 2023-12-08

## 2023-12-07 RX ADMIN — ATENOLOL 50 MG: 50 TABLET ORAL at 06:18

## 2023-12-07 RX ADMIN — THERA TABS 1 TABLET: TAB at 08:32

## 2023-12-07 RX ADMIN — Medication 100 MG: at 08:32

## 2023-12-07 RX ADMIN — MUPIROCIN: 20 OINTMENT TOPICAL at 09:11

## 2023-12-07 RX ADMIN — CYCLOSPORINE 1 DROP: 0.5 EMULSION OPHTHALMIC at 09:10

## 2023-12-07 RX ADMIN — FOLIC ACID 1 MG: 1 TABLET ORAL at 08:32

## 2023-12-07 RX ADMIN — ASPIRIN 81 MG: 81 TABLET, COATED ORAL at 08:32

## 2023-12-07 RX ADMIN — FUROSEMIDE 20 MG: 20 TABLET ORAL at 08:32

## 2023-12-07 RX ADMIN — APIXABAN 5 MG: 5 TABLET, FILM COATED ORAL at 08:32

## 2023-12-07 RX ADMIN — SPIRONOLACTONE 25 MG: 25 TABLET ORAL at 08:32

## 2023-12-07 ASSESSMENT — PAIN SCALES - WONG BAKER
WONGBAKER_NUMERICALRESPONSE: 0
WONGBAKER_NUMERICALRESPONSE: 0

## 2023-12-07 ASSESSMENT — PAIN SCALES - GENERAL
PAINLEVEL_OUTOF10: 0

## 2023-12-07 NOTE — DISCHARGE INSTRUCTIONS
Patient armband removed and given to patient to take home. Patient was informed of the privacy risks if armband lost or stolen      DISCHARGE SUMMARY from Nurse    PATIENT INSTRUCTIONS:    After general anesthesia or intravenous sedation, for 24 hours or while taking prescription Narcotics:  Limit your activities  Do not drive and operate hazardous machinery  Do not make important personal or business decisions  Do  not drink alcoholic beverages  If you have not urinated within 8 hours after discharge, please contact your surgeon on call. Report the following to your surgeon:  Excessive pain, swelling, redness or odor of or around the surgical area  Temperature over 100.5  Nausea and vomiting lasting longer than 4 hours or if unable to take medications  Any signs of decreased circulation or nerve impairment to extremity: change in color, persistent  numbness, tingling, coldness or increase pain  Any questions    What to do at Home:  Recommended activity: activity as tolerated    If you experience any of the following symptoms shortness of breath, chest pain, diarrhea, vomiting, severe pain, fever  , please follow up with PCP. *  Please give a list of your current medications to your Primary Care Provider. *  Please update this list whenever your medications are discontinued, doses are      changed, or new medications (including over-the-counter products) are added. *  Please carry medication information at all times in case of emergency situations. These are general instructions for a healthy lifestyle:    No smoking/ No tobacco products/ Avoid exposure to second hand smoke  Surgeon General's Warning:  Quitting smoking now greatly reduces serious risk to your health.     Obesity, smoking, and sedentary lifestyle greatly increases your risk for illness    A healthy diet, regular physical exercise & weight monitoring are important for maintaining a healthy lifestyle    You may be retaining fluid if you Physician for refills.     -------------------------------------------------------------------------------------------------------------------

## 2023-12-07 NOTE — DISCHARGE SUMMARY
[address] [facility] PHYSICAL REHABILITATION  [address] [place] [address], [id number] [place] [address]     INPATIENT REHABILITATION  DISCHARGE SUMMARY    Name: Trang Aceves MRN: [id number]   Age / Sex: 80 y.o. / female CSN: [id number]   YOB: 1928 Length of Stay: 13 days   Admit Date: 11/24/2023 Discharge Date: 12/7/2023       PRIMARY CARE PHYSICIAN: Kimberly Holder MD    DISCHARGE DIAGNOSES:    Primary Rehabilitation Diagnosis   1. Impaired Mobility and ADLs   2. Sepsis, acute pulmonary embolism, acute DVT, and acute deconditioning. CONSULTS CALLED:   [facility], NP     PROCEDURES DONE: None    BRIEF HISTORY: (from the history and physical completed by Dr. Moiz Pope)   89 y.o.  female with a past medical history of HTN, urinary retention with chronic sanchez s/p intermittent straight cath,  recurrent UTI's, afib not on anticoagulation who presented to [facility] on 11/20/23  with fever and hypoxia. Patient fell on 11/6/2023 and had a right hip fracture which was repaired on 11/7/23 at [address] [place] [address]. The patient's course at that time was complicated by SALVATORE and hypotension. The patient was then discharged to SNF after that hospitalization and has been doing poorly since. Apparently at the SNF the patient felt rigors and chills along with associated shortness of breath. The patient was noted to have a fever of 100.8 at her SNF prior to her ED transfer. Patient was brought to [facility] ED for further evaL. Patient was found to have low oxygen saturation on admission to the ED in the 80's. A CTA was done showing pulmonary emboli in the left lung and ground glass opacities suggestive of pneumonia/pulmonary edema. Patient was initiated on broad-spectrum antibiotics, and heparin gtt. Admitted to hospitalist service. Patient improved with antibiotics, and bronchodilators. Initially patient was started on IV heparin drip, which was later transitioned to NOAC at discharge.   Pulmonary skilled nursing, PT/OT. FOLLOW-UP RECOMMENDATIONS:      Follow-up Information     [facility] (Link) Follow up. Specialties: [address] [facility]  Why: Patient is set up for home health PT, OT, MSW, an aid and nursing. Contact information:  [address] [facility] [facility] [address] [facility]           Lakesha Almaguer MD Follow up on 1/4/2024. Specialty: Family Medicine  Why: Patient has an appointment scheduled with PCP Dr. Shanika Oglesby on   January 4, 2024 @ 2:40pm.  Contact information:  [address] [facility] [address] [facility]  [phone]             [address] [facility], [address] [facility], . Call on 2/13/2024. Specialty: Pulmonary Disease  Why: Patricia has an appointment scheduled with Pulmonary Disease Dr. Redd Saldivar on February 13, 2024 @ 10:00am.  Contact information:  [address] [place] [address] Benito Rodriguez MD Follow up. Specialty: Orthopedic Surgery  Why: Follow up appointment on 12/11/2023 @ 3 pm.  Contact information:  [address] [address] [phone]                        OTHER INSTRUCTIONS:  1. Diet: ADULT DIET; Regular; Low Fat/Low Chol/High Fiber/2 gm Na; 2000 ml; Lactose-Controlled; Likes soup and salad. 2. Activity. As tolerated. 3. Safety / fall precautions. TIME SPENT ON DISCHARGE ACTIVITIES: 39 minutes.       Signed:  JASMEET Ramirez CNP    12/7/2023

## 2023-12-07 NOTE — PLAN OF CARE
Problem: Safety - Adult  Goal: Free from fall injury  Outcome: Progressing  Flowsheets (Taken 12/6/2023 2016)  Free From Fall Injury: Instruct family/caregiver on patient safety     Problem: Skin/Tissue Integrity  Goal: Absence of new skin breakdown  Description: 1. Monitor for areas of redness and/or skin breakdown  2. Assess vascular access sites hourly  3. Every 4-6 hours minimum:  Change oxygen saturation probe site  4. Every 4-6 hours:  If on nasal continuous positive airway pressure, respiratory therapy assess nares and determine need for appliance change or resting period.   Outcome: Progressing     Problem: ABCDS Injury Assessment  Goal: Absence of physical injury  Outcome: Progressing  Flowsheets (Taken 12/6/2023 2016)  Absence of Physical Injury: Implement safety measures based on patient assessment     Problem: Pain  Goal: Verbalizes/displays adequate comfort level or baseline comfort level  Outcome: Progressing  Flowsheets (Taken 12/6/2023 2000)  Verbalizes/displays adequate comfort level or baseline comfort level:   Encourage patient to monitor pain and request assistance   Assess pain using appropriate pain scale   Administer analgesics based on type and severity of pain and evaluate response Donor Site Anesthesia Type: same as repair anesthesia

## 2023-12-07 NOTE — PLAN OF CARE
Problem: Physical Therapy - Adult  Goal: By Discharge: Performs mobility at highest level of function for planned discharge setting. See evaluation for individualized goals. Description: Physical Therapy Short Term Goals  Initiated 11/24/2023 and to be accomplished within 7 day(s) (12/1/2023)  1. Patient will supine to sit, sit to supine, roll right, and roll left  in bed with supervision/set-up without use of bed rails for support. (Goal Partially met 11/29/23 SBA/S rolling to the right; CGA rolling to the left)   2. Patient will transfer from bed to chair and chair to bed with CGA using the least restrictive device. (Goal Met 11/29/23)  3. Patient will perform sit to stand with contact guard assist (Goal Met 11/29/23)  4. Patient will ambulate with minimal assistance/contact guard assist for 50 feet with the least restrictive device. (Goal Partially Met CGA x 45 feet)  5. Patient will ascend/descend 4 stairs with B handrail(s) with minimal assistance/contact guard assist. (Goal Met 11/29/23)    Physical Therapy Long Term Goals  Initiated 11/24/2023 and to be accomplished within 14 day(s) (12/8/2023)  1. Patient will supine to sit, sit to supine, roll right, and roll left in bed with modified independence. 2.  Patient will transfer from bed to chair and chair to bed with supervision/set-up using the least restrictive device. 3.  Patient will perform sit to stand with supervision/set-up. 4.  Patient will ambulate with supervision/set-up for 150 feet with the least restrictive device.    5.  Patient will ascend/descend 4 stairs with B handrail(s) with supervision/set-up   12/7/2023 0847 by Vandana Little PT  Outcome: Adequate for Discharge

## 2023-12-07 NOTE — FLOWSHEET NOTE
12/07/23 1226   AVS Reviewed   AVS & discharge instructions reviewed with patient and/or representative? Yes   Reviewed instructions with Patient   Level of Understanding Questions answered; Teach back completed;Verbalized understanding

## 2023-12-07 NOTE — PLAN OF CARE
Problem: Physical Therapy - Adult  Goal: By Discharge: Performs mobility at highest level of function for planned discharge setting.  See evaluation for individualized goals.  Description: Physical Therapy Short Term Goals  Initiated 11/24/2023 and to be accomplished within 7 day(s) (12/1/2023)  1.  Patient will supine to sit, sit to supine, roll right, and roll left  in bed with supervision/set-up without use of bed rails for support. (Goal Partially met 11/29/23 SBA/S rolling to the right; CGA rolling to the left)   2.  Patient will transfer from bed to chair and chair to bed with CGA using the least restrictive device. (Goal Met 11/29/23)  3.  Patient will perform sit to stand with contact guard assist (Goal Met 11/29/23)  4.  Patient will ambulate with minimal assistance/contact guard assist for 50 feet with the least restrictive device. (Goal Partially Met CGA x 45 feet)  5.  Patient will ascend/descend 4 stairs with B handrail(s) with minimal assistance/contact guard assist. (Goal Met 11/29/23)    Physical Therapy Long Term Goals  Initiated 11/24/2023 and to be accomplished within 14 day(s) (12/8/2023)  1.  Patient will supine to sit, sit to supine, roll right, and roll left in bed with modified independence.   2.  Patient will transfer from bed to chair and chair to bed with supervision/set-up using the least restrictive device.   3.  Patient will perform sit to stand with supervision/set-up. 4.  Patient will ambulate with supervision/set-up for 150 feet with the least restrictive device.   5.  Patient will ascend/descend 4 stairs with B handrail(s) with supervision/set-up   Outcome: Adequate for Discharge     PHYSICAL THERAPY TREATMENT    Patient: Didi Peguero (89 y.o. female)  Date: 12/7/2023  Diagnosis: Impaired mobility and ADLs [Z74.09, Z78.9]   Precautions: Fall precautions  Chart, physical therapy assessment, plan of care and goals were reviewed.    Time in: 0800  Time out : 0900    Patient seen  Assessment    Posture Fair    Sitting - Static Good    Sitting - Dynamic Good;-    Standing - Static Fair;+ (with FWW)    Standing - Dynamic Fair (with FWW)    Comments        WHEELCHAIR MOBILITY/MANAGEMENT Daily Assessment   Able to Propel 205 feet   Assist Level Modified independent   Curbs/ramps assistance required     Wheelchair management manages B brakes with extender   Comments The patient propels using BUEs and LLE     THERAPEUTIC EXERCISES Daily Assessment     Refer to grid below        EXERCISE   Sets   Reps   Active Active Assist   Passive Self ROM   Comments   Ankle Pumps 3 10  [x] [] [] []    Quad Sets/Glut Sets   [] [] [] []    Hamstring Sets 3 10 [x] [] [] []    Short Arc Quads   [] [] [] []    Heel Slides   [] [] [] []    Straight Leg Raises   [] [] [] []    Hip Abd/Add 3 10 [x] [] [] []    Long Arc Quads 3 10 [x] [] [] []    Seated Marching   [] [] [] []    Standing Marching 3 10 [x] [] [] []       [] [] [] []        ASSESSMENT:  The patient has progressed well during her stay at this facility with noted significant improvement in all functional mobility tasks to include bed mobility, transfers and ambulation due to increased standing balance, weight shifting capacity, activity tolerance and BLE strength. The patient is appropriate for discharge home with 24 hr supervision. Progression toward goals:  [x]      Improving appropriately and progressing toward goals  []      Improving slowly and progressing toward goals  []      Not making progress toward goals and plan of care will be adjusted      PLAN:  Patient continues to benefit from skilled intervention to address the above impairments. Continue treatment per established plan of care. Discharge Recommendations:     Further Equipment Recommendations for Discharge:                      Estimated Discharge Date: 12/7/23    Activity Tolerance:   Good  Please refer to the flowsheet for vital signs taken during this treatment.     After treatment: [x] Patient left in no apparent distress in bed  [] Patient left in no apparent distress sitting up in chair  [] Patient left in no apparent distress in w/c mobilizing under own power  [] Patient left in no apparent distress dining area  [] Patient left in no apparent distress mobilizing under own power  [x] Call bell left within reach  [] Nursing notified  [] Caregiver present  [x] Bed alarm activated   [] Chair alarm activated        Aristides Romero, PT  12/7/2023

## 2023-12-07 NOTE — PROGRESS NOTES
Sw met with pt and her daughter in law, kimmy, in the room. Pt affirms her desire to return to her home at time of dc with continued therapy from [facility]. Pt affirms understanding that her daughter Vern Lala has moved into her home without permission. Pt states her desire for Vern Lala to leave the pt's home prior to her dc home. Collettradha Maximo has typed pt's wishes for her safe dc home - no fighting, limited visitation, 24/7 caregiver. Pt reviewed document and signed. Sw signed as a witness with pt's understanding that this is not a legally binding agreement. Pt states that her daughter, Vern Lala, has been calling her room to share her thoughts about pt's needs and intent to send the pt a bill for a million dollars for services provided over the years. Andersvanessa Maximo shares that pt has 24/7 care from a private duty caregiver who has been advised of the family dynamics. Sw will follow.

## 2023-12-07 NOTE — PROGRESS NOTES
Prior to discharge, nurse practitioner discussed and went through current and discharge medications in detail with the patient at the bedside. The NP discussed which drugs to discontinue, resume, and continue after discharge. NP explained and answered questions about follow-up care/pcp/specialist appointments, as well as discharge process expectations. The patient expressed her understanding verbally. NP discussed options with patient regarding sanchez catheter removal prior to discharge. Patient is diagnosed with chronic urinary retention with intermittent catheterization at home. Patient wants to continue intermittent catheterization at home but will keep sanchez in for the next 2-3 days. NP has ordered home health nurse to provide sanchez care and education, and removal on visit. Patient understands and agrees to plan. Patient will be discharged with home health, skilled nursing, PT/OT. NP discussed discharge process with discharge RN.